# Patient Record
Sex: FEMALE | Race: WHITE | NOT HISPANIC OR LATINO | Employment: UNEMPLOYED | URBAN - NONMETROPOLITAN AREA
[De-identification: names, ages, dates, MRNs, and addresses within clinical notes are randomized per-mention and may not be internally consistent; named-entity substitution may affect disease eponyms.]

---

## 2017-06-01 ENCOUNTER — TELEPHONE (OUTPATIENT)
Dept: OBSTETRICS AND GYNECOLOGY | Facility: CLINIC | Age: 42
End: 2017-06-01

## 2017-06-01 RX ORDER — NORETHINDRONE ACETATE AND ETHINYL ESTRADIOL AND FERROUS FUMARATE 1MG-20(24)
1 KIT ORAL DAILY
Qty: 28 TABLET | Refills: 1 | Status: SHIPPED | OUTPATIENT
Start: 2017-06-01 | End: 2018-06-26 | Stop reason: SDUPTHER

## 2017-06-01 NOTE — TELEPHONE ENCOUNTER
----- Message from Shilpa Gray sent at 6/1/2017  2:37 PM EDT -----  Contact: PT  PT IS SCHEDULED FOR ANNUAL IN June WITH ROSALIE, BUT NEEDS 1 PACK OF MINASTRIN 24 FE SENT TO [facility] IN [place].  THANKS

## 2017-06-15 VITALS
SYSTOLIC BLOOD PRESSURE: 124 MMHG | WEIGHT: 265 LBS | HEIGHT: 66 IN | BODY MASS INDEX: 42.59 KG/M2 | DIASTOLIC BLOOD PRESSURE: 76 MMHG

## 2017-06-16 ENCOUNTER — OFFICE VISIT (OUTPATIENT)
Dept: OBSTETRICS AND GYNECOLOGY | Facility: CLINIC | Age: 42
End: 2017-06-16

## 2017-06-16 VITALS
BODY MASS INDEX: 41.62 KG/M2 | DIASTOLIC BLOOD PRESSURE: 82 MMHG | WEIGHT: 259 LBS | SYSTOLIC BLOOD PRESSURE: 124 MMHG | HEIGHT: 66 IN

## 2017-06-16 DIAGNOSIS — Z01.419 ENCOUNTER FOR GYNECOLOGICAL EXAMINATION WITHOUT ABNORMAL FINDING: Primary | ICD-10-CM

## 2017-06-16 DIAGNOSIS — Z12.4 SCREENING FOR MALIGNANT NEOPLASM OF CERVIX: ICD-10-CM

## 2017-06-16 PROCEDURE — 99395 PREV VISIT EST AGE 18-39: CPT | Performed by: PHYSICIAN ASSISTANT

## 2017-06-16 NOTE — PROGRESS NOTES
"Subjective   Chief Complaint   Patient presents with   • Gynecologic Exam     Nilay Xiao is a 41 y.o. year old  presenting to be seen for her annual exam.   She has no complaints today  She desires refills minastrin fe -nonsmoker     Has no family history of breast cancer     Past Medical History:   Diagnosis Date   • GERD (gastroesophageal reflux disease)    • History of Papanicolaou smear of cervix 2016        Current Outpatient Prescriptions:   •  Norethin Ace-Eth Estrad-FE (MINASTRIN 24 FE) 1-20 MG-MCG(24) chewable tablet, Chew 1 each Daily., Disp: 28 tablet, Rfl: 1   No Known Allergies   Past Surgical History:   Procedure Laterality Date   • CYST REMOVAL      from jawbone   • NO PAST SURGERIES     • WISDOM TOOTH EXTRACTION        Social History     Social History   • Marital status:      Spouse name: N/A   • Number of children: N/A   • Years of education: N/A     Occupational History   • Not on file.     Social History Main Topics   • Smoking status: Never Smoker   • Smokeless tobacco: Never Used   • Alcohol use No   • Drug use: No   • Sexual activity: Defer     Other Topics Concern   • Not on file     Social History Narrative      History reviewed. No pertinent family history.    The following portions of the patient's history were reviewed and updated as appropriate:problem list, current medications, allergies, past family history, past medical history, past social history and past surgical history.    Review of Systems   Constitutional: Negative.    Gastrointestinal: Negative.    Genitourinary: Negative.            Objective   /82  Ht 66\" (167.6 cm)  Wt 259 lb (117 kg)  LMP 2017  BMI 41.8 kg/m2    Physical Exam   Constitutional: She appears well-developed and well-nourished.   Pulmonary/Chest: Right breast exhibits no inverted nipple, no mass, no nipple discharge, no skin change and no tenderness. Left breast exhibits no inverted nipple, no mass, no nipple " discharge, no skin change and no tenderness.   Abdominal: Soft. Normal appearance. She exhibits no distension. There is no tenderness.   Genitourinary: Vagina normal and uterus normal. There is no tenderness or lesion on the right labia. There is no tenderness or lesion on the left labia. Cervix exhibits no motion tenderness and no discharge. Right adnexum displays no mass and no tenderness. Left adnexum displays no mass and no tenderness.   Skin: Skin is warm, dry and intact.   Psychiatric: She has a normal mood and affect. Her behavior is normal.            Assessment /Plan      Nilay was seen today for gynecologic exam.    Diagnoses and all orders for this visit:    Encounter for gynecological examination without abnormal finding  -     Mammo Screening Digital Tomosynthesis Bilateral With CAD    Screening for malignant neoplasm of cervix  -     Liquid-based Pap Smear, Screening; Future                 This note was electronically signed.    Augusta Jaramillo PA-C   June 16, 2017

## 2017-06-21 ENCOUNTER — TRANSCRIBE ORDERS (OUTPATIENT)
Dept: MAMMOGRAPHY | Facility: HOSPITAL | Age: 42
End: 2017-06-21

## 2017-06-30 DIAGNOSIS — Z12.4 SCREENING FOR MALIGNANT NEOPLASM OF CERVIX: ICD-10-CM

## 2017-07-03 ENCOUNTER — HOSPITAL ENCOUNTER (OUTPATIENT)
Dept: MAMMOGRAPHY | Facility: HOSPITAL | Age: 42
Discharge: HOME | End: 2017-07-03
Admitting: PHYSICIAN ASSISTANT

## 2017-07-03 PROCEDURE — 77063 BREAST TOMOSYNTHESIS BI: CPT

## 2017-07-03 PROCEDURE — G0202 SCR MAMMO BI INCL CAD: HCPCS

## 2018-04-11 ENCOUNTER — TRANSCRIBE ORDERS (OUTPATIENT)
Dept: ADMINISTRATIVE | Facility: HOSPITAL | Age: 43
End: 2018-04-11

## 2018-04-11 DIAGNOSIS — Z12.31 ENCOUNTER FOR SCREENING MAMMOGRAM FOR MALIGNANT NEOPLASM OF BREAST: Primary | ICD-10-CM

## 2018-04-12 ENCOUNTER — TRANSCRIBE ORDERS (OUTPATIENT)
Dept: ADMINISTRATIVE | Facility: HOSPITAL | Age: 43
End: 2018-04-12

## 2018-04-12 DIAGNOSIS — Z12.39 SCREENING BREAST EXAMINATION: Primary | ICD-10-CM

## 2018-06-26 ENCOUNTER — TELEPHONE (OUTPATIENT)
Dept: OBSTETRICS AND GYNECOLOGY | Facility: CLINIC | Age: 43
End: 2018-06-26

## 2018-06-26 RX ORDER — NORETHINDRONE ACETATE AND ETHINYL ESTRADIOL AND FERROUS FUMARATE 1MG-20(24)
1 KIT ORAL DAILY
Qty: 28 TABLET | Refills: 0 | Status: SHIPPED | OUTPATIENT
Start: 2018-06-26 | End: 2018-07-26 | Stop reason: SDUPTHER

## 2018-06-26 NOTE — TELEPHONE ENCOUNTER
----- Message from Julio Cesar Ragsdale sent at 6/26/2018  2:48 PM EDT -----  Contact: patient  This is Marcella's patient. She is requesting a refill on her birth control. She is scheduled for her annual on 07/06/2018.    [facility] in [place].

## 2018-07-06 ENCOUNTER — HOSPITAL ENCOUNTER (OUTPATIENT)
Dept: MAMMOGRAPHY | Facility: HOSPITAL | Age: 43
Discharge: HOME | End: 2018-07-06
Admitting: PHYSICIAN ASSISTANT

## 2018-07-06 ENCOUNTER — OFFICE VISIT (OUTPATIENT)
Dept: OBSTETRICS AND GYNECOLOGY | Facility: CLINIC | Age: 43
End: 2018-07-06

## 2018-07-06 VITALS
SYSTOLIC BLOOD PRESSURE: 126 MMHG | DIASTOLIC BLOOD PRESSURE: 80 MMHG | WEIGHT: 256.4 LBS | HEIGHT: 66 IN | BODY MASS INDEX: 41.21 KG/M2

## 2018-07-06 DIAGNOSIS — Z12.4 SCREENING FOR MALIGNANT NEOPLASM OF CERVIX: ICD-10-CM

## 2018-07-06 DIAGNOSIS — Z01.419 ENCOUNTER FOR GYNECOLOGICAL EXAMINATION WITHOUT ABNORMAL FINDING: Primary | ICD-10-CM

## 2018-07-06 DIAGNOSIS — Z30.41 ENCOUNTER FOR SURVEILLANCE OF CONTRACEPTIVE PILLS: ICD-10-CM

## 2018-07-06 DIAGNOSIS — Z12.39 SCREENING BREAST EXAMINATION: ICD-10-CM

## 2018-07-06 PROCEDURE — 77063 BREAST TOMOSYNTHESIS BI: CPT

## 2018-07-06 PROCEDURE — 77067 SCR MAMMO BI INCL CAD: CPT

## 2018-07-06 PROCEDURE — 99396 PREV VISIT EST AGE 40-64: CPT | Performed by: PHYSICIAN ASSISTANT

## 2018-07-06 RX ORDER — NORETHINDRONE ACETATE AND ETHINYL ESTRADIOL AND FERROUS FUMARATE 1MG-20(24)
1 KIT ORAL
COMMUNITY
Start: 2017-01-09 | End: 2019-06-25 | Stop reason: SDUPTHER

## 2018-07-06 NOTE — PROGRESS NOTES
"Subjective   Chief Complaint   Patient presents with   • Gynecologic Exam     Last pap done -WNL; MMG done today; no complaints       Nilay Xiao is a 43 y.o. year old  presenting to be seen for her annual gynecological exam.   She has no complaints or concerns  Desires refills of minastrin 24 fe-nonsmoker  Doesn't have bleed very often with minastrin  Had mammogram this morning [facility]    Past Medical History:   Diagnosis Date   • GERD (gastroesophageal reflux disease)    • History of Papanicolaou smear of cervix 2016        Current Outpatient Prescriptions:   •  Norethin Ace-Eth Estrad-FE (MINASTRIN 24 FE) 1-20 MG-MCG(24) chewable tablet, Chew 1 each Daily., Disp: 28 tablet, Rfl: 0  •  Norethin Ace-Eth Estrad-FE (MINASTRIN 24 FE) 1-20 MG-MCG(24) chewable tablet, Chew 1 tablet., Disp: , Rfl:    Allergies   Allergen Reactions   • Ampicillin Rash      Past Surgical History:   Procedure Laterality Date   • CYST REMOVAL      from jawbone   • NO PAST SURGERIES     • WISDOM TOOTH EXTRACTION        Social History     Social History   • Marital status:      Spouse name: N/A   • Number of children: N/A   • Years of education: N/A     Occupational History   • Not on file.     Social History Main Topics   • Smoking status: Never Smoker   • Smokeless tobacco: Never Used   • Alcohol use No   • Drug use: No   • Sexual activity: Yes     Partners: Male     Birth control/ protection: OCP     Other Topics Concern   • Not on file     Social History Narrative   • No narrative on file      Family History   Problem Relation Age of Onset   • Diabetes Father    • No Known Problems Mother        Review of Systems   Constitutional: Negative.    Gastrointestinal: Negative.    Genitourinary: Negative.            Objective   /80   Ht 167.6 cm (66\")   Wt 116 kg (256 lb 6.4 oz)   Breastfeeding? No   BMI 41.38 kg/m²     Physical Exam   Constitutional: She appears well-developed and well-nourished. She is cooperative. "   Pulmonary/Chest: Right breast exhibits no inverted nipple, no mass, no nipple discharge, no skin change and no tenderness. Left breast exhibits no inverted nipple, no mass, no nipple discharge, no skin change and no tenderness.   Abdominal: Soft. Normal appearance. There is no tenderness.   Genitourinary: Vagina normal and uterus normal. There is no tenderness or lesion on the right labia. There is no tenderness or lesion on the left labia. Cervix exhibits no motion tenderness and no discharge. Right adnexum displays no mass and no tenderness. Left adnexum displays no mass and no tenderness.   Genitourinary Comments: Pap done   Neurological: She is alert.   Skin: Skin is warm and dry.   Psychiatric: She has a normal mood and affect. Her behavior is normal.            Assessment and Plan  Nilay was seen today for gynecologic exam.    Diagnoses and all orders for this visit:    Encounter for gynecological examination without abnormal finding    Screening for malignant neoplasm of cervix  -     Liquid-based Pap Smear, Screening; Future    Encounter for surveillance of contraceptive pills      Patient Instructions   Self breast exam monthly  Regular excercise             This note was electronically signed.    Augusta Jaramillo PA-C   July 6, 2018

## 2018-07-12 DIAGNOSIS — Z12.4 SCREENING FOR MALIGNANT NEOPLASM OF CERVIX: ICD-10-CM

## 2018-07-26 RX ORDER — NORETHINDRONE ACETATE AND ETHINYL ESTRADIOL AND FERROUS FUMARATE 1MG-20(24)
KIT ORAL
Qty: 28 TABLET | Refills: 11 | Status: SHIPPED | OUTPATIENT
Start: 2018-07-26 | End: 2019-06-25 | Stop reason: SDUPTHER

## 2019-05-21 ENCOUNTER — TRANSCRIBE ORDERS (OUTPATIENT)
Dept: OBSTETRICS AND GYNECOLOGY | Facility: CLINIC | Age: 44
End: 2019-05-21

## 2019-05-21 DIAGNOSIS — Z12.39 SCREENING BREAST EXAMINATION: Primary | ICD-10-CM

## 2019-06-25 ENCOUNTER — TELEPHONE (OUTPATIENT)
Dept: OBSTETRICS AND GYNECOLOGY | Facility: CLINIC | Age: 44
End: 2019-06-25

## 2019-06-25 RX ORDER — NORETHINDRONE ACETATE AND ETHINYL ESTRADIOL AND FERROUS FUMARATE 1MG-20(24)
1 KIT ORAL DAILY
Qty: 28 TABLET | Refills: 1 | Status: SHIPPED | OUTPATIENT
Start: 2019-06-25 | End: 2019-07-19 | Stop reason: ALTCHOICE

## 2019-06-25 NOTE — TELEPHONE ENCOUNTER
----- Message from Annie Sung sent at 6/25/2019  1:32 PM EDT -----  Contact: Pt  Pt requested a refill for her birth control.    She is jerrell'd for her annual on 07-23    RX: [facility]

## 2019-07-19 ENCOUNTER — OFFICE VISIT (OUTPATIENT)
Dept: OBSTETRICS AND GYNECOLOGY | Facility: CLINIC | Age: 44
End: 2019-07-19

## 2019-07-19 ENCOUNTER — HOSPITAL ENCOUNTER (OUTPATIENT)
Dept: MAMMOGRAPHY | Facility: HOSPITAL | Age: 44
Discharge: HOME | End: 2019-07-19
Admitting: PHYSICIAN ASSISTANT

## 2019-07-19 VITALS
SYSTOLIC BLOOD PRESSURE: 142 MMHG | WEIGHT: 269.6 LBS | BODY MASS INDEX: 43.33 KG/M2 | HEIGHT: 66 IN | DIASTOLIC BLOOD PRESSURE: 94 MMHG

## 2019-07-19 DIAGNOSIS — Z01.419 ENCOUNTER FOR GYNECOLOGICAL EXAMINATION WITHOUT ABNORMAL FINDING: Primary | ICD-10-CM

## 2019-07-19 DIAGNOSIS — Z12.4 SCREENING FOR CERVICAL CANCER: ICD-10-CM

## 2019-07-19 DIAGNOSIS — Z30.41 ENCOUNTER FOR SURVEILLANCE OF CONTRACEPTIVE PILLS: ICD-10-CM

## 2019-07-19 DIAGNOSIS — Z12.39 SCREENING BREAST EXAMINATION: ICD-10-CM

## 2019-07-19 PROCEDURE — 99396 PREV VISIT EST AGE 40-64: CPT | Performed by: PHYSICIAN ASSISTANT

## 2019-07-19 PROCEDURE — 77067 SCR MAMMO BI INCL CAD: CPT

## 2019-07-19 PROCEDURE — 77063 BREAST TOMOSYNTHESIS BI: CPT

## 2019-07-19 RX ORDER — NORETHINDRONE ACETATE AND ETHINYL ESTRADIOL AND FERROUS FUMARATE 1MG-20(24)
1 KIT ORAL DAILY
Qty: 28 TABLET | Refills: 12 | Status: SHIPPED | OUTPATIENT
Start: 2019-07-19 | End: 2019-07-29

## 2019-07-19 NOTE — PATIENT INSTRUCTIONS
Encourage self breast exam monthly  Regular exercise  Recommend establish care with pcp. Blood pressure mildly elevated today and if remains up recommend progesterone only birth control

## 2019-07-19 NOTE — PROGRESS NOTES
"Subjective   Chief Complaint   Patient presents with   • Gynecologic Exam     Last pap: 18 WNL, MMG 19 Bi-rads 2. Needs refill on Minastrin 24 Fe       Nilay Xiao is a 44 y.o. year old  presenting to be seen for her annual gynecological exam.   She has no complaints or concerns.  Screening mammogram this morning at [facility].  Desires refills of Minastrin 24 FE and desires prescription dispense as written.  She is using Minastrin mainly for heavy periods and dysmenorrhea.    Past Medical History:   Diagnosis Date   • GERD (gastroesophageal reflux disease)    • History of Papanicolaou smear of cervix 2016        Current Outpatient Medications:   •  MINASTRIN 24 FE 1-20 MG-MCG(24) chewable tablet, Chew 1 tablet Daily., Disp: 28 tablet, Rfl: 12   Allergies   Allergen Reactions   • Ampicillin Rash      Past Surgical History:   Procedure Laterality Date   • CYST REMOVAL      from jawbone   • NO PAST SURGERIES     • WISDOM TOOTH EXTRACTION        Social History     Socioeconomic History   • Marital status:      Spouse name: Not on file   • Number of children: Not on file   • Years of education: Not on file   • Highest education level: Not on file   Tobacco Use   • Smoking status: Never Smoker   • Smokeless tobacco: Never Used   Substance and Sexual Activity   • Alcohol use: No   • Drug use: No   • Sexual activity: Yes     Partners: Male     Birth control/protection: OCP      Family History   Problem Relation Age of Onset   • Diabetes Father    • No Known Problems Mother        Review of Systems   Constitutional: Negative.    Gastrointestinal: Negative.    Genitourinary: Negative.            Objective   /94   Ht 167.6 cm (66\")   Wt 122 kg (269 lb 9.6 oz)   Breastfeeding? No   BMI 43.51 kg/m²     Physical Exam   Constitutional: She appears well-developed and well-nourished. She is cooperative. No distress.   Pulmonary/Chest: Right breast exhibits no inverted nipple, no mass, " no nipple discharge, no skin change and no tenderness. Left breast exhibits no inverted nipple, no mass, no nipple discharge, no skin change and no tenderness.   Abdominal: Soft. Normal appearance. There is no tenderness.   Genitourinary: Vagina normal and uterus normal. There is no tenderness or lesion on the right labia. There is no tenderness or lesion on the left labia. Cervix exhibits no motion tenderness and no discharge. Right adnexum displays no mass and no tenderness. Left adnexum displays no mass and no tenderness.   Genitourinary Comments: Pap done    Neurological: She is alert.   Skin: Skin is warm and dry.   Psychiatric: She has a normal mood and affect. Her behavior is normal.            Assessment and Plan  Nilay was seen today for gynecologic exam.    Diagnoses and all orders for this visit:    Encounter for gynecological examination without abnormal finding    Screening for cervical cancer  -     Liquid-based Pap Smear, Screening; Future    Encounter for surveillance of contraceptive pills    Other orders  -     MINASTRIN 24 FE 1-20 MG-MCG(24) chewable tablet; Chew 1 tablet Daily.      Patient Instructions   Encourage self breast exam monthly  Regular exercise  Recommend establish care with pcp. Blood pressure mildly elevated today and if remains up recommend progesterone only birth control              This note was electronically signed.    Augusta Jaramillo PA-C   July 19, 2019

## 2019-07-26 DIAGNOSIS — Z12.4 SCREENING FOR CERVICAL CANCER: ICD-10-CM

## 2019-07-29 ENCOUNTER — TELEPHONE (OUTPATIENT)
Dept: OBSTETRICS AND GYNECOLOGY | Facility: CLINIC | Age: 44
End: 2019-07-29

## 2019-07-29 RX ORDER — NORETHINDRONE ACETATE AND ETHINYL ESTRADIOL AND FERROUS FUMARATE 1MG-20(24)
1 KIT ORAL DAILY
Qty: 28 TABLET | Refills: 12 | Status: SHIPPED | OUTPATIENT
Start: 2019-07-29 | End: 2019-08-19 | Stop reason: SDUPTHER

## 2019-07-29 NOTE — TELEPHONE ENCOUNTER
----- Message from Shilpa Gray sent at 7/29/2019  9:01 AM EDT -----  Contact: PT  THIS IS ROSALIE'S PT.  SHE CALLED AND SAID THE MINASTRIN ISN'T COVERED BY HER INSURANCE.  CAN WE SEND MIBELAS TO [facility] IN [place]?  THANKS

## 2019-08-19 ENCOUNTER — OFFICE VISIT (OUTPATIENT)
Dept: FAMILY MEDICINE CLINIC | Facility: CLINIC | Age: 44
End: 2019-08-19

## 2019-08-19 VITALS
TEMPERATURE: 98.7 F | OXYGEN SATURATION: 98 % | BODY MASS INDEX: 43.71 KG/M2 | HEIGHT: 66 IN | WEIGHT: 272 LBS | HEART RATE: 98 BPM | SYSTOLIC BLOOD PRESSURE: 132 MMHG | DIASTOLIC BLOOD PRESSURE: 92 MMHG

## 2019-08-19 DIAGNOSIS — Z13.220 ENCOUNTER FOR LIPID SCREENING FOR CARDIOVASCULAR DISEASE: ICD-10-CM

## 2019-08-19 DIAGNOSIS — I10 ESSENTIAL HYPERTENSION: ICD-10-CM

## 2019-08-19 DIAGNOSIS — Z30.09 UNWANTED FERTILITY: Primary | ICD-10-CM

## 2019-08-19 DIAGNOSIS — J30.89 SEASONAL ALLERGIC RHINITIS DUE TO OTHER ALLERGIC TRIGGER: ICD-10-CM

## 2019-08-19 DIAGNOSIS — Z13.6 ENCOUNTER FOR LIPID SCREENING FOR CARDIOVASCULAR DISEASE: ICD-10-CM

## 2019-08-19 DIAGNOSIS — M79.605 LEFT LEG PAIN: ICD-10-CM

## 2019-08-19 PROCEDURE — 99204 OFFICE O/P NEW MOD 45 MIN: CPT | Performed by: FAMILY MEDICINE

## 2019-08-19 PROCEDURE — 82306 VITAMIN D 25 HYDROXY: CPT | Performed by: FAMILY MEDICINE

## 2019-08-19 PROCEDURE — 80061 LIPID PANEL: CPT | Performed by: FAMILY MEDICINE

## 2019-08-19 PROCEDURE — 82607 VITAMIN B-12: CPT | Performed by: FAMILY MEDICINE

## 2019-08-19 PROCEDURE — 83735 ASSAY OF MAGNESIUM: CPT | Performed by: FAMILY MEDICINE

## 2019-08-19 PROCEDURE — 84443 ASSAY THYROID STIM HORMONE: CPT | Performed by: FAMILY MEDICINE

## 2019-08-19 PROCEDURE — 85025 COMPLETE CBC W/AUTO DIFF WBC: CPT | Performed by: FAMILY MEDICINE

## 2019-08-19 PROCEDURE — 80053 COMPREHEN METABOLIC PANEL: CPT | Performed by: FAMILY MEDICINE

## 2019-08-19 RX ORDER — OMEGA-3 FATTY ACIDS/FISH OIL 300-1000MG
CAPSULE ORAL
COMMUNITY
End: 2021-08-03

## 2019-08-19 RX ORDER — MONTELUKAST SODIUM 10 MG/1
10 TABLET ORAL NIGHTLY
Qty: 30 TABLET | Refills: 2 | Status: SHIPPED | OUTPATIENT
Start: 2019-08-19 | End: 2019-11-04 | Stop reason: SDUPTHER

## 2019-08-19 RX ORDER — IBUPROFEN 200 MG
200 TABLET ORAL EVERY 8 HOURS PRN
COMMUNITY

## 2019-08-19 RX ORDER — NORETHINDRONE ACETATE AND ETHINYL ESTRADIOL AND FERROUS FUMARATE 1MG-20(24)
1 KIT ORAL DAILY
Qty: 28 TABLET | Refills: 12 | Status: SHIPPED | OUTPATIENT
Start: 2019-08-19 | End: 2020-07-21

## 2019-08-19 RX ORDER — ACETAMINOPHEN 500 MG
500 TABLET ORAL EVERY 6 HOURS PRN
COMMUNITY

## 2019-08-19 RX ORDER — LISINOPRIL 20 MG/1
20 TABLET ORAL DAILY
Qty: 30 TABLET | Refills: 2 | Status: SHIPPED | OUTPATIENT
Start: 2019-08-19 | End: 2019-11-04 | Stop reason: SDUPTHER

## 2019-08-19 RX ORDER — NORETHINDRONE ACETATE AND ETHINYL ESTRADIOL AND FERROUS FUMARATE 1MG-20(24)
1 KIT ORAL DAILY
Refills: 12 | COMMUNITY
Start: 2019-07-29 | End: 2020-07-21 | Stop reason: ALTCHOICE

## 2019-08-20 LAB
25(OH)D3 SERPL-MCNC: 9.5 NG/ML (ref 30–100)
ALBUMIN SERPL-MCNC: 4.4 G/DL (ref 3.5–5.2)
ALBUMIN/GLOB SERPL: 1.4 G/DL
ALP SERPL-CCNC: 62 U/L (ref 39–117)
ALT SERPL W P-5'-P-CCNC: 7 U/L (ref 1–33)
ANION GAP SERPL CALCULATED.3IONS-SCNC: 13.4 MMOL/L (ref 5–15)
AST SERPL-CCNC: 11 U/L (ref 1–32)
BASOPHILS # BLD AUTO: 0.07 10*3/MM3 (ref 0–0.2)
BASOPHILS NFR BLD AUTO: 1.1 % (ref 0–1.5)
BILIRUB SERPL-MCNC: 0.2 MG/DL (ref 0.2–1.2)
BUN BLD-MCNC: 8 MG/DL (ref 6–20)
BUN/CREAT SERPL: 10.7 (ref 7–25)
CALCIUM SPEC-SCNC: 9.1 MG/DL (ref 8.6–10.5)
CHLORIDE SERPL-SCNC: 102 MMOL/L (ref 98–107)
CHOLEST SERPL-MCNC: 195 MG/DL (ref 0–200)
CO2 SERPL-SCNC: 23.6 MMOL/L (ref 22–29)
CREAT BLD-MCNC: 0.75 MG/DL (ref 0.57–1)
DEPRECATED RDW RBC AUTO: 45.4 FL (ref 37–54)
EOSINOPHIL # BLD AUTO: 0.09 10*3/MM3 (ref 0–0.4)
EOSINOPHIL NFR BLD AUTO: 1.4 % (ref 0.3–6.2)
ERYTHROCYTE [DISTWIDTH] IN BLOOD BY AUTOMATED COUNT: 13.2 % (ref 12.3–15.4)
GFR SERPL CREATININE-BSD FRML MDRD: 85 ML/MIN/1.73
GLOBULIN UR ELPH-MCNC: 3.1 GM/DL
GLUCOSE BLD-MCNC: 97 MG/DL (ref 65–99)
HCT VFR BLD AUTO: 44.1 % (ref 34–46.6)
HDLC SERPL-MCNC: 60 MG/DL (ref 40–60)
HGB BLD-MCNC: 13.7 G/DL (ref 12–15.9)
IMM GRANULOCYTES # BLD AUTO: 0.02 10*3/MM3 (ref 0–0.05)
IMM GRANULOCYTES NFR BLD AUTO: 0.3 % (ref 0–0.5)
LDLC SERPL CALC-MCNC: 115 MG/DL (ref 0–100)
LDLC/HDLC SERPL: 1.91 {RATIO}
LYMPHOCYTES # BLD AUTO: 1.41 10*3/MM3 (ref 0.7–3.1)
LYMPHOCYTES NFR BLD AUTO: 21.2 % (ref 19.6–45.3)
MAGNESIUM SERPL-MCNC: 2.2 MG/DL (ref 1.6–2.6)
MCH RBC QN AUTO: 29.1 PG (ref 26.6–33)
MCHC RBC AUTO-ENTMCNC: 31.1 G/DL (ref 31.5–35.7)
MCV RBC AUTO: 93.8 FL (ref 79–97)
MONOCYTES # BLD AUTO: 0.38 10*3/MM3 (ref 0.1–0.9)
MONOCYTES NFR BLD AUTO: 5.7 % (ref 5–12)
NEUTROPHILS # BLD AUTO: 4.69 10*3/MM3 (ref 1.7–7)
NEUTROPHILS NFR BLD AUTO: 70.3 % (ref 42.7–76)
NRBC BLD AUTO-RTO: 0 /100 WBC (ref 0–0.2)
PLATELET # BLD AUTO: 308 10*3/MM3 (ref 140–450)
PMV BLD AUTO: 12.4 FL (ref 6–12)
POTASSIUM BLD-SCNC: 4.1 MMOL/L (ref 3.5–5.2)
PROT SERPL-MCNC: 7.5 G/DL (ref 6–8.5)
RBC # BLD AUTO: 4.7 10*6/MM3 (ref 3.77–5.28)
SODIUM BLD-SCNC: 139 MMOL/L (ref 136–145)
TRIGL SERPL-MCNC: 101 MG/DL (ref 0–150)
TSH SERPL DL<=0.05 MIU/L-ACNC: 3.25 MIU/ML (ref 0.27–4.2)
VIT B12 BLD-MCNC: 292 PG/ML (ref 211–946)
VLDLC SERPL-MCNC: 20.2 MG/DL (ref 5–40)
WBC NRBC COR # BLD: 6.66 10*3/MM3 (ref 3.4–10.8)

## 2019-09-09 ENCOUNTER — OFFICE VISIT (OUTPATIENT)
Dept: FAMILY MEDICINE CLINIC | Facility: CLINIC | Age: 44
End: 2019-09-09

## 2019-09-09 VITALS
BODY MASS INDEX: 42.75 KG/M2 | HEIGHT: 66 IN | TEMPERATURE: 99.1 F | SYSTOLIC BLOOD PRESSURE: 110 MMHG | OXYGEN SATURATION: 98 % | HEART RATE: 115 BPM | DIASTOLIC BLOOD PRESSURE: 81 MMHG | WEIGHT: 266 LBS

## 2019-09-09 DIAGNOSIS — E53.8 VITAMIN B12 DEFICIENCY: ICD-10-CM

## 2019-09-09 DIAGNOSIS — D17.21 LIPOMA OF RIGHT UPPER EXTREMITY: ICD-10-CM

## 2019-09-09 DIAGNOSIS — E55.9 VITAMIN D DEFICIENCY: Primary | ICD-10-CM

## 2019-09-09 PROCEDURE — 99214 OFFICE O/P EST MOD 30 MIN: CPT | Performed by: FAMILY MEDICINE

## 2019-09-09 RX ORDER — ERGOCALCIFEROL 1.25 MG/1
50000 CAPSULE ORAL WEEKLY
Qty: 8 CAPSULE | Refills: 0 | Status: SHIPPED | OUTPATIENT
Start: 2019-09-09 | End: 2022-09-19

## 2019-09-09 NOTE — PROGRESS NOTES
"Nilay Xiao     VITALS: Blood pressure 132/92, pulse 98, temperature 98.7 °F (37.1 °C), temperature source Oral, height 167.6 cm (66\"), weight 123 kg (272 lb), SpO2 98 %, not currently breastfeeding.    Subjective  Chief Complaint:   Chief Complaint   Patient presents with   • Hypertension        History of Present Illness:  Patient is a 44 y.o.  female who presents to clinic secondary to establishment of care.  She has several concerns today.    Patient thinks that she is progressing to hypertension.  She brings in the list of her blood pressures for the last week.  Her blood pressures are in the 130s to 160s/90s to 110s.  She complains of anxiety, blurry vision, fatigue with these blood pressures.  She also complains about headaches and dizziness.  She denies any shortness of breath or chest pain.  She denies any edema.  She has never had blood pressure problems before.  She does try to follow a low-salt diet.    Patient also complains of sinus congestion and headaches.  She denies any fevers, chills, ear pain.  She denies any rhinorrhea, coughing, shortness of breath.  She has tried Benadryl and phenylephrine without any alleviation in her symptoms.    Patient complains of left leg numbness and tingling, maybe a little cramping.  She denies any chronic back pain.  She denies any changes in urination or bowel movements.  She denies any pain.    No complaints about any of the medications.    The following portions of the patient's history were reviewed and updated as appropriate: allergies, current medications, past family history, past medical history, past social history, past surgical history and problem list.    Past Medical History  Past Medical History:   Diagnosis Date   • GERD (gastroesophageal reflux disease)    • History of Papanicolaou smear of cervix 06/2016       Review of Systems   Constitutional: Positive for fatigue. Negative for chills and fever.   HENT: Positive for congestion, sinus pressure and " sinus pain. Negative for ear pain and rhinorrhea.    Eyes: Positive for visual disturbance.   Respiratory: Negative for cough and shortness of breath.    Cardiovascular: Negative for chest pain and palpitations.   Gastrointestinal: Negative for constipation, diarrhea, nausea and vomiting.   Musculoskeletal: Negative for back pain and neck pain.   Neurological: Positive for dizziness, numbness and headaches.   Psychiatric/Behavioral: The patient is nervous/anxious.        Surgical History  Past Surgical History:   Procedure Laterality Date   • CYST REMOVAL      from jawbone   • NO PAST SURGERIES     • WISDOM TOOTH EXTRACTION         Family History  Family History   Problem Relation Age of Onset   • Diabetes Father    • Kidney nephrosis Mother    • Cancer Maternal Aunt    • Cancer Paternal Uncle        Social History  Social History     Socioeconomic History   • Marital status:      Spouse name: Not on file   • Number of children: Not on file   • Years of education: Not on file   • Highest education level: Not on file   Tobacco Use   • Smoking status: Never Smoker   • Smokeless tobacco: Never Used   Substance and Sexual Activity   • Alcohol use: No   • Drug use: No   • Sexual activity: Yes     Partners: Male     Birth control/protection: OCP       Objective  Physical Exam    Gen: Patient in NAD. Pleasant and answers appropriately. A&Ox3.    Skin: Warm and dry with normal turgor. No purpura, rashes, or unusual pigmentation noted. Hair is normal in appearance and distribution.    HEENT: NC/AT. No lesions noted. Conjunctiva clear, sclera nonicteric. PERRL. EOMI without nystagmus or strabismus. Fundi appear benign. No hemorrhages or exudates of eyes. Auditory canals are patent bilaterally without lesions. TMs intact,  nonerythematous, bulging without lesions. Nasal mucosa erythematous  and edematous. Frontal and maxillary sinuses are tender. O/P erythematous and moist without exudate.    Neck: Supple without lymph  nodes palpated. FROM.     Lungs: CTA B/L without rales, rhonchi, crackles, or wheezes.    Heart: RRR. S1 and S2 normal. No S3 or S4. No MRGT.    Abd: Soft, nontender,nondistended. (+)BSx4 quadrants.     Extrem: No CCE. Radial pulses 2+/4 and equal B/L. FROMx4. No bone, joint, or muscle tenderness noted. SLRT  Benign.    Neuro: No focal motor/sensory deficits.    Procedures    Assessment/Plan  Nilay Xiao is a 44 y.o. here for establishment of care.  Diagnoses and all orders for this visit:    Unwanted fertility  -     Norethin Ace-Eth Estrad-FE (MIBELAS 24 FE) 1-20 MG-MCG(24) chewable tablet; Chew 1 tablet Daily.    Essential hypertension  -     lisinopril (PRINIVIL,ZESTRIL) 20 MG tablet; Take 1 tablet by mouth Daily.  -     CBC Auto Differential; Future  -     Comprehensive Metabolic Panel; Future  -     TSH; Future  -     CBC Auto Differential  -     Comprehensive Metabolic Panel  -     TSH    Seasonal allergic rhinitis due to other allergic trigger  -     montelukast (SINGULAIR) 10 MG tablet; Take 1 tablet by mouth Every Night.    Encounter for lipid screening for cardiovascular disease  -     Lipid Panel; Future  -     Lipid Panel    Left leg pain  -     Vitamin B12; Future  -     Vitamin D 25 Hydroxy; Future  -     Magnesium; Future  -     Vitamin B12  -     Vitamin D 25 Hydroxy  -     Magnesium      Patient's Body mass index is 43.9 kg/m². BMI is above normal parameters. Recommendations include: exercise counseling and nutrition counseling.     Findings and plans discussed with patient who verbalizes understanding and agreement. Will followup with patient once results are in. Patient to followup at clinic PRN or in three weeks for further medical followup.    Stephenie Ewing MD    EMR Dragon/Transcription Disclaimer:  Much of this encounter note is an electronic transcription/translation of spoken language to printed text.  The electronic translation of spoken language may permit erroneous, or at times,  nonsensical words or phrases to be inadvertently transcribed.  Although I have reviewed the note for such errors, some may still exist.  Answers for HPI/ROS submitted by the patient on 8/17/2019   Hypertension  Chronicity: new  Progression since onset: unchanged  anxiety: Yes  blurred vision: Yes  malaise/fatigue: Yes  orthopnea: No  peripheral edema: No  PND: No  sweats: No  Agents associated with hypertension: decongestants, NSAIDs, oral contraceptives  CAD risks: obesity, sedentary lifestyle, stress  Compliance problems: diet, exercise

## 2019-09-13 ENCOUNTER — TELEPHONE (OUTPATIENT)
Dept: FAMILY MEDICINE CLINIC | Facility: CLINIC | Age: 44
End: 2019-09-13

## 2019-09-30 NOTE — PROGRESS NOTES
"Nilay Xiao     VITALS: Blood pressure 110/81, pulse 115, temperature 99.1 °F (37.3 °C), temperature source Oral, height 167.6 cm (65.98\"), weight 121 kg (266 lb), SpO2 98 %, not currently breastfeeding.    Subjective  Chief Complaint:   Chief Complaint   Patient presents with   • Hypertension        History of Present Illness:  Patient is a 44 y.o.  female with a medical history significant for hypertension and allergic rhinitis who presents to clinic secondary to medical followup.  She states that she has a mass on her right shoulder that she would like me to look at today.  It has been there for several years.  It does keep on growing.  She is not bothered by it.  It does not hurt her.  Nothing makes it worse.  Nothing makes it better.  She is also here today to go over lab work.  She brings in today a list of her blood pressures that she is taking from home.  Blood pressures ranged in the 120s to 130s/70s to 80s.    No complaints about any of the medications.    The following portions of the patient's history were reviewed and updated as appropriate: allergies, current medications, past family history, past medical history, past social history, past surgical history and problem list.    Past Medical History  Past Medical History:   Diagnosis Date   • GERD (gastroesophageal reflux disease)    • History of Papanicolaou smear of cervix 06/2016       Review of Systems   Respiratory: Negative for shortness of breath and wheezing.    Cardiovascular: Negative for chest pain and palpitations.       Surgical History  Past Surgical History:   Procedure Laterality Date   • CYST REMOVAL      from jawbone   • NO PAST SURGERIES     • WISDOM TOOTH EXTRACTION         Family History  Family History   Problem Relation Age of Onset   • Diabetes Father    • Kidney nephrosis Mother    • Cancer Maternal Aunt    • Cancer Paternal Uncle        Social History  Social History     Socioeconomic History   • Marital status:      " Spouse name: Not on file   • Number of children: Not on file   • Years of education: Not on file   • Highest education level: Not on file   Tobacco Use   • Smoking status: Never Smoker   • Smokeless tobacco: Never Used   Substance and Sexual Activity   • Alcohol use: No   • Drug use: No   • Sexual activity: Yes     Partners: Male     Birth control/protection: OCP       Objective  Physical Exam    Gen: Patient in NAD. Pleasant and answers appropriately. A&Ox3.    Skin: Warm and dry with normal turgor. No purpura, rashes, or unusual pigmentation noted. Hair is normal in appearance and distribution.  Lipoma over right shoulder palpated.    HEENT: NC/AT. No lesions noted. Conjunctiva clear, sclera nonicteric. PERRL. EOMI without nystagmus or strabismus. Fundi appear benign. No hemorrhages or exudates of eyes. Auditory canals are patent bilaterally without lesions. TMs intact,  nonerythematous, nonbulging without lesions. Nasal mucosa pink, nonerythematous, and nonedematous. Frontal and maxillary sinuses are nontender. O/P nonerythematous and moist without exudate.    Neck: Supple without lymph nodes palpated. FROM.     Lungs: CTA B/L without rales, rhonchi, crackles, or wheezes.    Heart: RRR. S1 and S2 normal. No S3 or S4. No MRGT.    Abd: Soft, nontender,nondistended. (+)BSx4 quadrants.     Extrem: No CCE. Radial pulses 2+/4 and equal B/L. FROMx4. No bone, joint, or muscle tenderness noted.    Neuro: No focal motor/sensory deficits.    Procedures    Assessment/Plan  Nilay Xiao is a 44 y.o. here for medical followup.  Diagnoses and all orders for this visit:    Vitamin D deficiency  -     vitamin D (ERGOCALCIFEROL) 16089 units capsule capsule; Take 1 capsule by mouth 1 (One) Time Per Week.    Vitamin B12 deficiency  Patient to stop by for B12 shots weekly.    Lipoma of right upper extremity  Discussed with patient.  Discussed surgical referral.  Patient will wait for now.    Patient's Body mass index is 42.95 kg/m².  BMI is above normal parameters. Recommendations include: exercise counseling and nutrition counseling.        Findings and plans discussed with patient who verbalizes understanding and agreement. Will followup with patient once results are in. Patient to followup at clinic PRN or in 2 months for further medical followup.    MD CHARLES Prather Dragon/Transcription Disclaimer:  Much of this encounter note is an electronic transcription/translation of spoken language to printed text.  The electronic translation of spoken language may permit erroneous, or at times, nonsensical words or phrases to be inadvertently transcribed.  Although I have reviewed the note for such errors, some may still exist.

## 2019-11-04 ENCOUNTER — TELEPHONE (OUTPATIENT)
Dept: FAMILY MEDICINE CLINIC | Facility: CLINIC | Age: 44
End: 2019-11-04

## 2019-11-04 DIAGNOSIS — I10 ESSENTIAL HYPERTENSION: ICD-10-CM

## 2019-11-04 DIAGNOSIS — J30.89 SEASONAL ALLERGIC RHINITIS DUE TO OTHER ALLERGIC TRIGGER: ICD-10-CM

## 2019-11-04 RX ORDER — LISINOPRIL 20 MG/1
20 TABLET ORAL DAILY
Qty: 90 TABLET | Refills: 3 | Status: SHIPPED | OUTPATIENT
Start: 2019-11-04 | End: 2020-11-13 | Stop reason: SDUPTHER

## 2019-11-04 RX ORDER — MONTELUKAST SODIUM 10 MG/1
10 TABLET ORAL NIGHTLY
Qty: 90 TABLET | Refills: 3 | Status: SHIPPED | OUTPATIENT
Start: 2019-11-04 | End: 2020-11-13 | Stop reason: SDUPTHER

## 2019-11-04 NOTE — TELEPHONE ENCOUNTER
Patient called reports her last visit in Sept. Is going to cost her $140,i checked with [place] her insurance paid nothing because she said it is probably a high deductible,she has a follow up in November,she cannot afford to pay this so she is just requesting medication refills on her Lisinopril & Singular for as many months as you can,reports her blood pressures have been good & she can fax you her log?

## 2019-11-05 NOTE — TELEPHONE ENCOUNTER
No need to fax. Just let her know that the top number needs to be below 130 and the bottom number needs to be below 100. Just check it several times a week - if they start being elevated to call. I refilled for a year - have her plan on coming back sometime in August 2020 for a recheck. Thanks. Let her know that if she needs to be seen, we do have same day appointments. Thanks.

## 2019-11-05 NOTE — TELEPHONE ENCOUNTER
No need to fax. Just let her know that the top number needs to be below 130 and the bottom number needs to be below 100. Just check it several times a week - if they start being elevated to call. I refilled for a year - have her plan on coming back sometime in August 2020 for a recheck. Thanks. Let her know that if she needs to be seen, we do have same day appointments. Thanks.      Patient notified & verbalized understanding.

## 2020-05-12 ENCOUNTER — TRANSCRIBE ORDERS (OUTPATIENT)
Dept: MAMMOGRAPHY | Facility: HOSPITAL | Age: 45
End: 2020-05-12

## 2020-05-12 DIAGNOSIS — Z12.31 ENCOUNTER FOR SCREENING MAMMOGRAM FOR MALIGNANT NEOPLASM OF BREAST: Primary | ICD-10-CM

## 2020-07-21 ENCOUNTER — OFFICE VISIT (OUTPATIENT)
Dept: OBSTETRICS AND GYNECOLOGY | Facility: CLINIC | Age: 45
End: 2020-07-21

## 2020-07-21 ENCOUNTER — HOSPITAL ENCOUNTER (OUTPATIENT)
Dept: MAMMOGRAPHY | Facility: HOSPITAL | Age: 45
Discharge: HOME | End: 2020-07-21
Admitting: PHYSICIAN ASSISTANT

## 2020-07-21 VITALS
SYSTOLIC BLOOD PRESSURE: 136 MMHG | DIASTOLIC BLOOD PRESSURE: 86 MMHG | WEIGHT: 265.8 LBS | HEIGHT: 66 IN | BODY MASS INDEX: 42.72 KG/M2

## 2020-07-21 DIAGNOSIS — Z12.4 SCREENING FOR CERVICAL CANCER: ICD-10-CM

## 2020-07-21 DIAGNOSIS — Z01.419 ENCOUNTER FOR GYNECOLOGICAL EXAMINATION WITHOUT ABNORMAL FINDING: Primary | ICD-10-CM

## 2020-07-21 DIAGNOSIS — Z12.31 ENCOUNTER FOR SCREENING MAMMOGRAM FOR MALIGNANT NEOPLASM OF BREAST: ICD-10-CM

## 2020-07-21 DIAGNOSIS — Z30.41 ENCOUNTER FOR SURVEILLANCE OF CONTRACEPTIVE PILLS: ICD-10-CM

## 2020-07-21 PROCEDURE — 99396 PREV VISIT EST AGE 40-64: CPT | Performed by: PHYSICIAN ASSISTANT

## 2020-07-21 PROCEDURE — 77067 SCR MAMMO BI INCL CAD: CPT

## 2020-07-21 PROCEDURE — 77063 BREAST TOMOSYNTHESIS BI: CPT

## 2020-07-21 RX ORDER — ACETAMINOPHEN AND CODEINE PHOSPHATE 120; 12 MG/5ML; MG/5ML
1 SOLUTION ORAL DAILY
Qty: 28 TABLET | Refills: 12 | Status: SHIPPED | OUTPATIENT
Start: 2020-07-21 | End: 2021-07-21

## 2020-07-21 NOTE — PROGRESS NOTES
Subjective   Chief Complaint   Patient presents with   • Gynecologic Exam     Last pap done 19-WNL; MMG done today; No complaints       Nilay Xiao is a 45 y.o. year old  presenting to be seen for her annual gynecological exam.   She has no complaints  Had screening mammogram at [facility] this morning  Was started on lisinopril last August for hypertension  Has been taking Junel fe 1/20 ocp and desires to continue ocp  LMP 2020       Past Medical History:   Diagnosis Date   • GERD (gastroesophageal reflux disease)    • History of Papanicolaou smear of cervix 2016        Current Outpatient Medications:   •  acetaminophen (TYLENOL) 500 MG tablet, Take 500 mg by mouth Every 6 (Six) Hours As Needed for Mild Pain ., Disp: , Rfl:   •  ibuprofen (ADVIL,MOTRIN) 200 MG tablet, Take 200 mg by mouth Every 8 (Eight) Hours As Needed for Mild Pain ., Disp: , Rfl:   •  Ibuprofen (MIDOL) 200 MG capsule, Take  by mouth., Disp: , Rfl:   •  lisinopril (PRINIVIL,ZESTRIL) 20 MG tablet, Take 1 tablet by mouth Daily., Disp: 90 tablet, Rfl: 3  •  montelukast (SINGULAIR) 10 MG tablet, Take 1 tablet by mouth Every Night., Disp: 90 tablet, Rfl: 3  •  vitamin D (ERGOCALCIFEROL) 24056 units capsule capsule, Take 1 capsule by mouth 1 (One) Time Per Week., Disp: 8 capsule, Rfl: 0  •  norethindrone (MICRONOR) 0.35 MG tablet, Take 1 tablet by mouth Daily., Disp: 28 tablet, Rfl: 12   Allergies   Allergen Reactions   • Ampicillin Rash      Past Surgical History:   Procedure Laterality Date   • CYST REMOVAL      from jawbone   • NO PAST SURGERIES     • WISDOM TOOTH EXTRACTION        Social History     Socioeconomic History   • Marital status:      Spouse name: Not on file   • Number of children: Not on file   • Years of education: Not on file   • Highest education level: Not on file   Tobacco Use   • Smoking status: Never Smoker   • Smokeless tobacco: Never Used   Substance and Sexual Activity   • Alcohol use: No   • Drug use: No  "  • Sexual activity: Yes     Partners: Male     Birth control/protection: OCP      Family History   Problem Relation Age of Onset   • Diabetes Father    • Kidney nephrosis Mother    • Cancer Maternal Aunt    • Cancer Paternal Uncle        Review of Systems   Constitutional: Negative for chills, fatigue and fever.   Gastrointestinal: Negative for abdominal pain, nausea and vomiting.   Endocrine:        Hot flashes   Genitourinary: Negative for difficulty urinating, dysuria, menstrual problem, pelvic pain, vaginal bleeding and vaginal discharge.   Musculoskeletal: Negative.            Objective   /86   Ht 167.6 cm (65.98\")   Wt 121 kg (265 lb 12.8 oz)   LMP 07/17/2020 (Exact Date)   Breastfeeding No   BMI 42.93 kg/m²     Physical Exam   Constitutional: She is cooperative. No distress.   Eyes: Conjunctivae, EOM and lids are normal.   Pulmonary/Chest: Right breast exhibits no inverted nipple, no mass, no nipple discharge, no skin change and no tenderness. Left breast exhibits no inverted nipple, no mass, no nipple discharge, no skin change and no tenderness.   Abdominal: Soft. Normal appearance. There is no tenderness. There is no rigidity and no guarding.   Genitourinary: Uterus normal. There is no tenderness or lesion on the right labia. There is no tenderness or lesion on the left labia. Cervix exhibits no motion tenderness, no discharge and no friability. Right adnexum displays no mass and no tenderness. Left adnexum displays no mass and no tenderness. No erythema or tenderness in the vagina. No vaginal discharge found.   Genitourinary Comments: Pap done  Scant dark blood seen   Musculoskeletal: Normal range of motion.   Neurological: She is alert.   Skin: Skin is warm and dry. No lesion and no rash noted.   Psychiatric: She has a normal mood and affect. Her behavior is normal. Thought content normal.            Assessment and Plan  Nilay was seen today for gynecologic exam.    Diagnoses and all orders " for this visit:    Encounter for gynecological examination without abnormal finding    Screening for cervical cancer  -     Liquid-based Pap Smear, Screening; Future    Encounter for surveillance of contraceptive pills    Other orders  -     norethindrone (MICRONOR) 0.35 MG tablet; Take 1 tablet by mouth Daily.      Patient Instructions   Encourage self breast exam monthly  Annual screening mammogram  Regular exercise  Discussed hypertension, age and estrogen containing contraception not advised.  Recommend switching to progesterone only contraception and will go to [facility]. Patient advised to take ocp consistently                 This note was electronically signed.    Augusta Jaramillo PA-C   July 21, 2020

## 2020-07-21 NOTE — PATIENT INSTRUCTIONS
Encourage self breast exam monthly  Annual screening mammogram  Regular exercise  Discussed hypertension, age and estrogen containing contraception not advised.  Recommend switching to progesterone only contraception and will go to [facility]. Patient advised to take ocp consistently

## 2020-07-28 DIAGNOSIS — Z12.4 SCREENING FOR CERVICAL CANCER: ICD-10-CM

## 2020-11-13 DIAGNOSIS — J30.89 SEASONAL ALLERGIC RHINITIS DUE TO OTHER ALLERGIC TRIGGER: ICD-10-CM

## 2020-11-13 DIAGNOSIS — I10 ESSENTIAL HYPERTENSION: ICD-10-CM

## 2020-11-13 RX ORDER — MONTELUKAST SODIUM 10 MG/1
10 TABLET ORAL NIGHTLY
Qty: 90 TABLET | Refills: 1 | Status: SHIPPED | OUTPATIENT
Start: 2020-11-13 | End: 2021-02-02 | Stop reason: SDUPTHER

## 2020-11-13 RX ORDER — LISINOPRIL 20 MG/1
20 TABLET ORAL DAILY
Qty: 90 TABLET | Refills: 1 | Status: SHIPPED | OUTPATIENT
Start: 2020-11-13 | End: 2021-02-02 | Stop reason: SDUPTHER

## 2021-01-26 ENCOUNTER — TELEPHONE (OUTPATIENT)
Dept: FAMILY MEDICINE CLINIC | Facility: CLINIC | Age: 46
End: 2021-01-26

## 2021-01-26 NOTE — TELEPHONE ENCOUNTER
PT IS REQUESTING A CALL BACK REGARDING A BILL FROM AN OUTSIDE LAB THAT SHE KEEPS GETTING LATE NOTICES FROM THAT SHE SHOULDN'T BE RESPONSIBLE FOR.    [employer]    [facility]  [phone]

## 2021-01-26 NOTE — TELEPHONE ENCOUNTER
PT IS REQUESTING A CALL BACK REGARDING A BILL FROM AN OUTSIDE LAB THAT SHE KEEPS GETTING LATE NOTICES FROM THAT SHE SHOULDN'T BE RESPONSIBLE FOR.    [employer]    [facility]  [phone]    [employer] representative spoke with patient.

## 2021-02-02 ENCOUNTER — OFFICE VISIT (OUTPATIENT)
Dept: FAMILY MEDICINE CLINIC | Facility: CLINIC | Age: 46
End: 2021-02-02

## 2021-02-02 VITALS
OXYGEN SATURATION: 98 % | TEMPERATURE: 97.3 F | HEIGHT: 66 IN | SYSTOLIC BLOOD PRESSURE: 126 MMHG | WEIGHT: 267 LBS | HEART RATE: 80 BPM | BODY MASS INDEX: 42.91 KG/M2 | DIASTOLIC BLOOD PRESSURE: 74 MMHG

## 2021-02-02 DIAGNOSIS — Z13.220 ENCOUNTER FOR LIPID SCREENING FOR CARDIOVASCULAR DISEASE: ICD-10-CM

## 2021-02-02 DIAGNOSIS — Z13.6 ENCOUNTER FOR LIPID SCREENING FOR CARDIOVASCULAR DISEASE: ICD-10-CM

## 2021-02-02 DIAGNOSIS — E55.9 VITAMIN D DEFICIENCY: ICD-10-CM

## 2021-02-02 DIAGNOSIS — J30.89 SEASONAL ALLERGIC RHINITIS DUE TO OTHER ALLERGIC TRIGGER: ICD-10-CM

## 2021-02-02 DIAGNOSIS — I10 ESSENTIAL HYPERTENSION: ICD-10-CM

## 2021-02-02 DIAGNOSIS — D17.21 LIPOMA OF RIGHT UPPER EXTREMITY: Primary | ICD-10-CM

## 2021-02-02 DIAGNOSIS — E53.8 VITAMIN B12 DEFICIENCY: ICD-10-CM

## 2021-02-02 PROCEDURE — 99214 OFFICE O/P EST MOD 30 MIN: CPT | Performed by: FAMILY MEDICINE

## 2021-02-02 RX ORDER — MONTELUKAST SODIUM 10 MG/1
10 TABLET ORAL NIGHTLY
Qty: 90 TABLET | Refills: 1 | Status: SHIPPED | OUTPATIENT
Start: 2021-02-02 | End: 2021-08-02 | Stop reason: SDUPTHER

## 2021-02-02 RX ORDER — LISINOPRIL 20 MG/1
20 TABLET ORAL DAILY
Qty: 90 TABLET | Refills: 1 | Status: SHIPPED | OUTPATIENT
Start: 2021-02-02 | End: 2021-08-02 | Stop reason: SDUPTHER

## 2021-02-03 ENCOUNTER — LAB (OUTPATIENT)
Dept: FAMILY MEDICINE CLINIC | Facility: CLINIC | Age: 46
End: 2021-02-03

## 2021-02-03 DIAGNOSIS — E53.8 VITAMIN B12 DEFICIENCY: ICD-10-CM

## 2021-02-03 DIAGNOSIS — I10 ESSENTIAL HYPERTENSION: ICD-10-CM

## 2021-02-03 DIAGNOSIS — Z13.6 ENCOUNTER FOR LIPID SCREENING FOR CARDIOVASCULAR DISEASE: ICD-10-CM

## 2021-02-03 DIAGNOSIS — Z13.220 ENCOUNTER FOR LIPID SCREENING FOR CARDIOVASCULAR DISEASE: ICD-10-CM

## 2021-02-03 DIAGNOSIS — E55.9 VITAMIN D DEFICIENCY: ICD-10-CM

## 2021-02-03 PROCEDURE — 80061 LIPID PANEL: CPT | Performed by: FAMILY MEDICINE

## 2021-02-03 PROCEDURE — 82607 VITAMIN B-12: CPT | Performed by: FAMILY MEDICINE

## 2021-02-03 PROCEDURE — 84443 ASSAY THYROID STIM HORMONE: CPT | Performed by: FAMILY MEDICINE

## 2021-02-03 PROCEDURE — 80053 COMPREHEN METABOLIC PANEL: CPT | Performed by: FAMILY MEDICINE

## 2021-02-03 PROCEDURE — 82306 VITAMIN D 25 HYDROXY: CPT | Performed by: FAMILY MEDICINE

## 2021-02-03 PROCEDURE — 36415 COLL VENOUS BLD VENIPUNCTURE: CPT

## 2021-02-03 NOTE — PROGRESS NOTES
"Nilay Xiao     VITALS: Blood pressure 126/74, pulse 80, temperature 97.3 °F (36.3 °C), temperature source Temporal, height 167.6 cm (65.98\"), weight 121 kg (267 lb), SpO2 98 %, not currently breastfeeding.    Subjective  Chief Complaint  Hypertension    Subjective          History of Present Illness:  Patient is a 45 y.o.  female with medical conditions significant for hypertension, allergic rhinitis, vitamin D deficiency, vitamin B12 deficiency who presents to clinic secondary to medical followup.  No new or acute concerns.  Patient is doing well.    No complaints about any of the medications.    The following portions of the patient's history were reviewed and updated as appropriate: allergies, current medications, past family history, past medical history, past social history, past surgical history and problem list.    Past Medical History  Past Medical History:   Diagnosis Date   • GERD (gastroesophageal reflux disease)    • History of Papanicolaou smear of cervix 06/2016       Surgical History  Past Surgical History:   Procedure Laterality Date   • CYST REMOVAL      from jawbone   • NO PAST SURGERIES     • WISDOM TOOTH EXTRACTION         Family History  Family History   Problem Relation Age of Onset   • Diabetes Father    • Kidney nephrosis Mother    • Cancer Maternal Aunt    • Cancer Paternal Uncle        Social History  Social History     Socioeconomic History   • Marital status:      Spouse name: Not on file   • Number of children: Not on file   • Years of education: Not on file   • Highest education level: Not on file   Tobacco Use   • Smoking status: Never Smoker   • Smokeless tobacco: Never Used   Substance and Sexual Activity   • Alcohol use: No   • Drug use: No   • Sexual activity: Yes     Partners: Male     Birth control/protection: OCP       Objective   Vital Signs:   /74 (BP Location: Right arm, Patient Position: Sitting, Cuff Size: Adult)   Pulse 80   Temp 97.3 °F (36.3 °C) (Temporal) " "  Ht 167.6 cm (65.98\")   Wt 121 kg (267 lb)   SpO2 98%   BMI 43.12 kg/m²     Physical Exam     Gen: Patient in NAD. Pleasant and answers appropriately. A&Ox3.    Skin: Warm and dry with normal turgor. No purpura, rashes, or unusual pigmentation noted. Hair is normal in appearance and distribution.    HEENT: NC/AT. No lesions noted. Conjunctiva clear, sclera nonicteric. PERRL. EOMI without nystagmus or strabismus. Fundi appear benign. No hemorrhages or exudates of eyes. Auditory canals are patent bilaterally without lesions. TMs intact,  nonerythematous, nonbulging without lesions. Nasal mucosa pink, nonerythematous, and nonedematous. Frontal and maxillary sinuses are nontender. O/P nonerythematous and moist without exudate.    Neck: Supple without lymph nodes palpated. FROM.     Lungs: CTA B/L without rales, rhonchi, crackles, or wheezes.    Heart: RRR. S1 and S2 normal. No S3 or S4. No MRGT.    Abd: Soft, nontender,nondistended. (+)BSx4 quadrants.     Extrem: No CCE. Radial pulses 2+/4 and equal B/L. FROMx4. No bone, joint, or muscle tenderness noted.  Lipoma over right upper extremity.    Neuro: No focal motor/sensory deficits.    Procedures    Result Review :   The following data was reviewed by: Stephenie Ewing MD on 02/02/2021:       Vitamin D 25 Hydroxy (08/19/2019 10:20)  Vitamin B12 (08/19/2019 10:20)  Lipid Panel (08/19/2019 10:20)  Comprehensive Metabolic Panel (08/19/2019 10:20)           Assessment and Plan    Nilay Xiao is a 45 y.o. here for medical followup.    Problem List Items Addressed This Visit     None      Visit Diagnoses     Essential hypertension    -  Primary    Relevant Medications    lisinopril (PRINIVIL,ZESTRIL) 20 MG tablet    Other Relevant Orders    Comprehensive Metabolic Panel    TSH    Seasonal allergic rhinitis due to other allergic trigger        Relevant Medications    montelukast (Singulair) 10 MG tablet    Lipoma of right upper extremity        Relevant Orders    " Ambulatory Referral to General Surgery    Referral to surgery for removal.    Encounter for lipid screening for cardiovascular disease        Relevant Orders    Lipid Panel    Vitamin D deficiency        Relevant Orders    Vitamin D 25 Hydroxy    Vitamin B12 deficiency        Relevant Orders    Vitamin B12            Patient's Body mass index is 43.12 kg/m². BMI is above normal parameters. Recommendations include: exercise counseling and nutrition counseling.                   Follow Up   Return in about 6 months (around 8/2/2021).  Findings and plans discussed with patient who verbalizes understanding and agreement. Will followup with patient once results are in. Patient was given instructions and counseling regarding her condition or for health maintenance advice. Please see specific information pulled into the AVS if appropriate.       Stephenie Ewing MD    EMR Dragon/Transcription Disclaimer:  Much of this encounter note is an electronic transcription/translation of spoken language to printed text.

## 2021-02-04 LAB
25(OH)D3 SERPL-MCNC: 34.4 NG/ML (ref 30–100)
ALBUMIN SERPL-MCNC: 4.3 G/DL (ref 3.5–5.2)
ALBUMIN/GLOB SERPL: 2.9 G/DL
ALP SERPL-CCNC: 69 U/L (ref 39–117)
ALT SERPL W P-5'-P-CCNC: 15 U/L (ref 1–33)
ANION GAP SERPL CALCULATED.3IONS-SCNC: 11.2 MMOL/L (ref 5–15)
AST SERPL-CCNC: 15 U/L (ref 1–32)
BILIRUB SERPL-MCNC: 0.3 MG/DL (ref 0–1.2)
BUN SERPL-MCNC: 10 MG/DL (ref 6–20)
BUN/CREAT SERPL: 14.7 (ref 7–25)
CALCIUM SPEC-SCNC: 9.5 MG/DL (ref 8.6–10.5)
CHLORIDE SERPL-SCNC: 105 MMOL/L (ref 98–107)
CHOLEST SERPL-MCNC: 174 MG/DL (ref 0–200)
CO2 SERPL-SCNC: 23.8 MMOL/L (ref 22–29)
CREAT SERPL-MCNC: 0.68 MG/DL (ref 0.57–1)
GFR SERPL CREATININE-BSD FRML MDRD: 94 ML/MIN/1.73
GLOBULIN UR ELPH-MCNC: 1.5 GM/DL
GLUCOSE SERPL-MCNC: 97 MG/DL (ref 65–99)
HDLC SERPL-MCNC: 51 MG/DL (ref 40–60)
LDLC SERPL CALC-MCNC: 109 MG/DL (ref 0–100)
LDLC/HDLC SERPL: 2.13 {RATIO}
POTASSIUM SERPL-SCNC: 4.2 MMOL/L (ref 3.5–5.2)
PROT SERPL-MCNC: 5.8 G/DL (ref 6–8.5)
SODIUM SERPL-SCNC: 140 MMOL/L (ref 136–145)
TRIGL SERPL-MCNC: 73 MG/DL (ref 0–150)
TSH SERPL DL<=0.05 MIU/L-ACNC: 4.07 UIU/ML (ref 0.27–4.2)
VIT B12 BLD-MCNC: 710 PG/ML (ref 211–946)
VLDLC SERPL-MCNC: 14 MG/DL (ref 5–40)

## 2021-02-10 ENCOUNTER — TELEPHONE (OUTPATIENT)
Dept: FAMILY MEDICINE CLINIC | Facility: CLINIC | Age: 46
End: 2021-02-10

## 2021-02-10 NOTE — TELEPHONE ENCOUNTER
----- Message from Stephenie Ewing MD sent at 2/10/2021  8:23 AM EST -----  Labs stable. Okay to either call or send letter to patient. Thanks.      Stable letter mailed.

## 2021-03-11 ENCOUNTER — OFFICE VISIT (OUTPATIENT)
Dept: SURGERY | Facility: CLINIC | Age: 46
End: 2021-03-11

## 2021-03-11 VITALS
HEART RATE: 84 BPM | DIASTOLIC BLOOD PRESSURE: 92 MMHG | SYSTOLIC BLOOD PRESSURE: 147 MMHG | HEIGHT: 65 IN | BODY MASS INDEX: 44.68 KG/M2 | WEIGHT: 268.2 LBS

## 2021-03-11 DIAGNOSIS — D17.1 LIPOMA OF TORSO: Primary | ICD-10-CM

## 2021-03-11 PROCEDURE — 99242 OFF/OP CONSLTJ NEW/EST SF 20: CPT | Performed by: SURGERY

## 2021-03-11 RX ORDER — CLINDAMYCIN PHOSPHATE 900 MG/50ML
900 INJECTION INTRAVENOUS ONCE
Status: CANCELLED | OUTPATIENT
Start: 2021-03-17 | End: 2021-03-11

## 2021-03-11 NOTE — PROGRESS NOTES
"Subjective   Nilay Xiao is a 45 y.o. female here today to check place on right shoulder.    History of Present Illness  Ms. Xiao was seen in the office today for a symptomatic lipoma of the right shoulder.  This has been present for 2 years but has gotten larger and causes discomfort in that it is right over her bra strap which causes discomfort.  Allergies   Allergen Reactions   • Ampicillin Rash     Current Outpatient Medications   Medication Sig Dispense Refill   • acetaminophen (TYLENOL) 500 MG tablet Take 500 mg by mouth Every 6 (Six) Hours As Needed for Mild Pain .     • ibuprofen (ADVIL,MOTRIN) 200 MG tablet Take 200 mg by mouth Every 8 (Eight) Hours As Needed for Mild Pain .     • Ibuprofen (MIDOL) 200 MG capsule Take  by mouth.     • lisinopril (PRINIVIL,ZESTRIL) 20 MG tablet Take 1 tablet by mouth Daily. 90 tablet 1   • montelukast (Singulair) 10 MG tablet Take 1 tablet by mouth Every Night. 90 tablet 1   • norethindrone (MICRONOR) 0.35 MG tablet Take 1 tablet by mouth Daily. 28 tablet 12   • Pseudoephedrine HCl (SINUS & ALLERGY 12 HOUR PO) Take  by mouth.     • vitamin D (ERGOCALCIFEROL) 98283 units capsule capsule Take 1 capsule by mouth 1 (One) Time Per Week. 8 capsule 0     No current facility-administered medications for this visit.     Past Medical History:   Diagnosis Date   • GERD (gastroesophageal reflux disease)    • History of Papanicolaou smear of cervix 06/2016   • Hypertension      Past Surgical History:   Procedure Laterality Date   • CYST REMOVAL      from jawbone   • NO PAST SURGERIES     • WISDOM TOOTH EXTRACTION         Pertinent Review of Systems:  Respiratory: no shortness of breath  Cardiovascular: no chest pain  Other pertinent:      Objective   Ht 165.1 cm (65\")   Wt 122 kg (268 lb 3.2 oz)   BMI 44.63 kg/m²   Physical Exam  General:  This is a WD WN female in no acute distress  Lungs:  Respiratory effort normal. Auscultation: Clear, without wheezes, rhonchi, rales  Heart:  Regular " rate and rhythm, without murmur, gallop, rub.  No pedal edema  Skin: On the right superior shoulder there is a subcutaneous mass measuring 5.6 x 3 cm consistent with lipoma    Results/Data:  Imaging:   Notes:   Lab:   Other:     Assessment/Plan   Symptomatic lipoma of the right shoulder    Proceed with surgical excision.  Given the large size this will be done in the operating room       Discussion/Summary    Time spent:     Patient's Body mass index is 44.63 kg/m². BMI is above normal parameters. Recommendations include: educational material.       Future Appointments   Date Time Provider Department Center   3/11/2021 10:00 AM Sidra Mackey MD [facility]   8/2/2021  3:45 PM Stephenie Ewing MD [facility]         Please note that portions of this note were completed with a voice recognition program.

## 2021-03-12 ENCOUNTER — TELEPHONE (OUTPATIENT)
Dept: SURGERY | Facility: CLINIC | Age: 46
End: 2021-03-12

## 2021-03-12 DIAGNOSIS — Z01.818 PRE-OP TESTING: Primary | ICD-10-CM

## 2021-03-15 ENCOUNTER — APPOINTMENT (OUTPATIENT)
Dept: PREADMISSION TESTING | Facility: HOSPITAL | Age: 46
End: 2021-03-15

## 2021-03-15 ENCOUNTER — LAB (OUTPATIENT)
Dept: LAB | Facility: HOSPITAL | Age: 46
End: 2021-03-15

## 2021-03-15 DIAGNOSIS — Z01.818 PRE-OP TESTING: ICD-10-CM

## 2021-03-15 DIAGNOSIS — D17.1 LIPOMA OF TORSO: ICD-10-CM

## 2021-03-15 LAB
ANION GAP SERPL CALCULATED.3IONS-SCNC: 10.5 MMOL/L (ref 5–15)
BUN SERPL-MCNC: 12 MG/DL (ref 6–20)
BUN/CREAT SERPL: 15.6 (ref 7–25)
CALCIUM SPEC-SCNC: 9.3 MG/DL (ref 8.6–10.5)
CHLORIDE SERPL-SCNC: 106 MMOL/L (ref 98–107)
CO2 SERPL-SCNC: 24.5 MMOL/L (ref 22–29)
CREAT SERPL-MCNC: 0.77 MG/DL (ref 0.57–1)
DEPRECATED RDW RBC AUTO: 46 FL (ref 37–54)
ERYTHROCYTE [DISTWIDTH] IN BLOOD BY AUTOMATED COUNT: 13.1 % (ref 12.3–15.4)
GFR SERPL CREATININE-BSD FRML MDRD: 82 ML/MIN/1.73
GLUCOSE SERPL-MCNC: 93 MG/DL (ref 65–99)
HCT VFR BLD AUTO: 42.1 % (ref 34–46.6)
HGB BLD-MCNC: 13.2 G/DL (ref 12–15.9)
MCH RBC QN AUTO: 29.9 PG (ref 26.6–33)
MCHC RBC AUTO-ENTMCNC: 31.4 G/DL (ref 31.5–35.7)
MCV RBC AUTO: 95.2 FL (ref 79–97)
PLATELET # BLD AUTO: 257 10*3/MM3 (ref 140–450)
PMV BLD AUTO: 11.1 FL (ref 6–12)
POTASSIUM SERPL-SCNC: 4.1 MMOL/L (ref 3.5–5.2)
RBC # BLD AUTO: 4.42 10*6/MM3 (ref 3.77–5.28)
SARS-COV-2 RNA NOSE QL NAA+PROBE: NOT DETECTED
SODIUM SERPL-SCNC: 141 MMOL/L (ref 136–145)
WBC # BLD AUTO: 7.61 10*3/MM3 (ref 3.4–10.8)

## 2021-03-15 PROCEDURE — 36415 COLL VENOUS BLD VENIPUNCTURE: CPT

## 2021-03-15 PROCEDURE — 85027 COMPLETE CBC AUTOMATED: CPT

## 2021-03-15 PROCEDURE — 80048 BASIC METABOLIC PNL TOTAL CA: CPT

## 2021-03-15 PROCEDURE — U0004 COV-19 TEST NON-CDC HGH THRU: HCPCS | Performed by: SURGERY

## 2021-03-15 RX ORDER — CHOLECALCIFEROL (VITAMIN D3) 125 MCG
500 CAPSULE ORAL DAILY
COMMUNITY
End: 2022-02-02

## 2021-03-15 NOTE — H&P
"Subjective   Nilay Xiao is a 45 y.o. female here today to check place on right shoulder.    History of Present Illness  Ms. Xiao was seen in the office today for a symptomatic lipoma of the right shoulder.  This has been present for 2 years but has gotten larger and causes discomfort in that it is right over her bra strap which causes discomfort.  Allergies   Allergen Reactions   • Ampicillin Rash     Current Outpatient Medications   Medication Sig Dispense Refill   • acetaminophen (TYLENOL) 500 MG tablet Take 500 mg by mouth Every 6 (Six) Hours As Needed for Mild Pain .     • ibuprofen (ADVIL,MOTRIN) 200 MG tablet Take 200 mg by mouth Every 8 (Eight) Hours As Needed for Mild Pain .     • Ibuprofen (MIDOL) 200 MG capsule Take  by mouth.     • lisinopril (PRINIVIL,ZESTRIL) 20 MG tablet Take 1 tablet by mouth Daily. 90 tablet 1   • montelukast (Singulair) 10 MG tablet Take 1 tablet by mouth Every Night. 90 tablet 1   • norethindrone (MICRONOR) 0.35 MG tablet Take 1 tablet by mouth Daily. 28 tablet 12   • Pseudoephedrine HCl (SINUS & ALLERGY 12 HOUR PO) Take  by mouth.     • vitamin D (ERGOCALCIFEROL) 39179 units capsule capsule Take 1 capsule by mouth 1 (One) Time Per Week. 8 capsule 0     No current facility-administered medications for this visit.     Past Medical History:   Diagnosis Date   • GERD (gastroesophageal reflux disease)    • History of Papanicolaou smear of cervix 06/2016   • Hypertension      Past Surgical History:   Procedure Laterality Date   • CYST REMOVAL      from jawbone   • NO PAST SURGERIES     • WISDOM TOOTH EXTRACTION         Pertinent Review of Systems:  Respiratory: no shortness of breath  Cardiovascular: no chest pain  Other pertinent:      Objective   Ht 165.1 cm (65\")   Wt 122 kg (268 lb 3.2 oz)   BMI 44.63 kg/m²   Physical Exam  General:  This is a WD WN female in no acute distress  Lungs:  Respiratory effort normal. Auscultation: Clear, without wheezes, rhonchi, rales  Heart:  Regular " rate and rhythm, without murmur, gallop, rub.  No pedal edema  Skin: On the right superior shoulder there is a subcutaneous mass measuring 5.6 x 3 cm consistent with lipoma    Results/Data:  Imaging:   Notes:   Lab:   Other:     Assessment/Plan   Symptomatic lipoma of the right shoulder    Proceed with surgical excision.  Given the large size this will be done in the operating room       Discussion/Summary    Time spent:     Patient's Body mass index is 44.63 kg/m². BMI is above normal parameters. Recommendations include: educational material.       Future Appointments   Date Time Provider Department Center   3/11/2021 10:00 AM Sidra Rodriguez MD [facility] [place] [facility]   8/2/2021  3:45 PM Stephenie Ewing MD [facility]         Please note that portions of this note were completed with a voice recognition program.    This document has been electronically signed by Sidra RODRIGUEZ MD on March 15, 2021 09:03 EDT

## 2021-03-16 ENCOUNTER — TELEPHONE (OUTPATIENT)
Dept: SURGERY | Facility: CLINIC | Age: 46
End: 2021-03-16

## 2021-03-17 ENCOUNTER — HOSPITAL ENCOUNTER (OUTPATIENT)
Facility: HOSPITAL | Age: 46
Setting detail: HOSPITAL OUTPATIENT SURGERY
Discharge: HOME | End: 2021-03-17
Attending: SURGERY | Admitting: SURGERY

## 2021-03-17 ENCOUNTER — ANESTHESIA (OUTPATIENT)
Dept: PERIOP | Facility: HOSPITAL | Age: 46
End: 2021-03-17

## 2021-03-17 ENCOUNTER — ANESTHESIA EVENT (OUTPATIENT)
Dept: PERIOP | Facility: HOSPITAL | Age: 46
End: 2021-03-17

## 2021-03-17 VITALS
RESPIRATION RATE: 18 BRPM | WEIGHT: 268.8 LBS | HEIGHT: 65 IN | HEART RATE: 71 BPM | SYSTOLIC BLOOD PRESSURE: 118 MMHG | OXYGEN SATURATION: 100 % | TEMPERATURE: 97.8 F | BODY MASS INDEX: 44.79 KG/M2 | DIASTOLIC BLOOD PRESSURE: 65 MMHG

## 2021-03-17 DIAGNOSIS — E66.01 MORBIDLY OBESE (HCC): ICD-10-CM

## 2021-03-17 DIAGNOSIS — D17.1 LIPOMA OF TORSO: ICD-10-CM

## 2021-03-17 LAB
B-HCG UR QL: NEGATIVE
INTERNAL NEGATIVE CONTROL: NEGATIVE
INTERNAL POSITIVE CONTROL: POSITIVE
Lab: NORMAL

## 2021-03-17 PROCEDURE — 25010000002 KETOROLAC TROMETHAMINE PER 15 MG: Performed by: NURSE ANESTHETIST, CERTIFIED REGISTERED

## 2021-03-17 PROCEDURE — 25010000002 PROPOFOL 10 MG/ML EMULSION: Performed by: NURSE ANESTHETIST, CERTIFIED REGISTERED

## 2021-03-17 PROCEDURE — 25010000002 FENTANYL CITRATE (PF) 100 MCG/2ML SOLUTION: Performed by: NURSE ANESTHETIST, CERTIFIED REGISTERED

## 2021-03-17 PROCEDURE — 25010000002 ONDANSETRON PER 1 MG: Performed by: NURSE ANESTHETIST, CERTIFIED REGISTERED

## 2021-03-17 PROCEDURE — 81025 URINE PREGNANCY TEST: CPT | Performed by: ANESTHESIOLOGY

## 2021-03-17 PROCEDURE — 25010000002 MIDAZOLAM PER 1 MG: Performed by: ANESTHESIOLOGY

## 2021-03-17 PROCEDURE — 12032 INTMD RPR S/A/T/EXT 2.6-7.5: CPT | Performed by: SURGERY

## 2021-03-17 PROCEDURE — 25010000002 DROPERIDOL PER 5 MG: Performed by: NURSE ANESTHETIST, CERTIFIED REGISTERED

## 2021-03-17 PROCEDURE — 11406 EXC TR-EXT B9+MARG >4.0 CM: CPT | Performed by: SURGERY

## 2021-03-17 RX ORDER — BUPIVACAINE HYDROCHLORIDE AND EPINEPHRINE 5; 5 MG/ML; UG/ML
INJECTION, SOLUTION EPIDURAL; INTRACAUDAL; PERINEURAL AS NEEDED
Status: DISCONTINUED | OUTPATIENT
Start: 2021-03-17 | End: 2021-03-17 | Stop reason: HOSPADM

## 2021-03-17 RX ORDER — IPRATROPIUM BROMIDE AND ALBUTEROL SULFATE 2.5; .5 MG/3ML; MG/3ML
3 SOLUTION RESPIRATORY (INHALATION) ONCE AS NEEDED
Status: DISCONTINUED | OUTPATIENT
Start: 2021-03-17 | End: 2021-03-17 | Stop reason: HOSPADM

## 2021-03-17 RX ORDER — SODIUM CHLORIDE, SODIUM LACTATE, POTASSIUM CHLORIDE, CALCIUM CHLORIDE 600; 310; 30; 20 MG/100ML; MG/100ML; MG/100ML; MG/100ML
100 INJECTION, SOLUTION INTRAVENOUS ONCE AS NEEDED
Status: DISCONTINUED | OUTPATIENT
Start: 2021-03-17 | End: 2021-03-17 | Stop reason: HOSPADM

## 2021-03-17 RX ORDER — CLINDAMYCIN PHOSPHATE 900 MG/50ML
900 INJECTION INTRAVENOUS ONCE
Status: COMPLETED | OUTPATIENT
Start: 2021-03-17 | End: 2021-03-17

## 2021-03-17 RX ORDER — SODIUM CHLORIDE, SODIUM LACTATE, POTASSIUM CHLORIDE, CALCIUM CHLORIDE 600; 310; 30; 20 MG/100ML; MG/100ML; MG/100ML; MG/100ML
INJECTION, SOLUTION INTRAVENOUS CONTINUOUS PRN
Status: DISCONTINUED | OUTPATIENT
Start: 2021-03-17 | End: 2021-03-17 | Stop reason: SURG

## 2021-03-17 RX ORDER — SODIUM CHLORIDE, SODIUM LACTATE, POTASSIUM CHLORIDE, CALCIUM CHLORIDE 600; 310; 30; 20 MG/100ML; MG/100ML; MG/100ML; MG/100ML
125 INJECTION, SOLUTION INTRAVENOUS ONCE
Status: COMPLETED | OUTPATIENT
Start: 2021-03-17 | End: 2021-03-17

## 2021-03-17 RX ORDER — MEPERIDINE HYDROCHLORIDE 25 MG/ML
12.5 INJECTION INTRAMUSCULAR; INTRAVENOUS; SUBCUTANEOUS
Status: DISCONTINUED | OUTPATIENT
Start: 2021-03-17 | End: 2021-03-17 | Stop reason: HOSPADM

## 2021-03-17 RX ORDER — KETOROLAC TROMETHAMINE 30 MG/ML
30 INJECTION, SOLUTION INTRAMUSCULAR; INTRAVENOUS EVERY 6 HOURS PRN
Status: COMPLETED | OUTPATIENT
Start: 2021-03-17 | End: 2021-03-17

## 2021-03-17 RX ORDER — SODIUM CHLORIDE 0.9 % (FLUSH) 0.9 %
10 SYRINGE (ML) INJECTION AS NEEDED
Status: DISCONTINUED | OUTPATIENT
Start: 2021-03-17 | End: 2021-03-17 | Stop reason: HOSPADM

## 2021-03-17 RX ORDER — MIDAZOLAM HYDROCHLORIDE 1 MG/ML
1 INJECTION INTRAMUSCULAR; INTRAVENOUS
Status: DISCONTINUED | OUTPATIENT
Start: 2021-03-17 | End: 2021-03-17 | Stop reason: HOSPADM

## 2021-03-17 RX ORDER — SODIUM CHLORIDE 0.9 % (FLUSH) 0.9 %
10 SYRINGE (ML) INJECTION EVERY 12 HOURS SCHEDULED
Status: DISCONTINUED | OUTPATIENT
Start: 2021-03-17 | End: 2021-03-17 | Stop reason: HOSPADM

## 2021-03-17 RX ORDER — DROPERIDOL 2.5 MG/ML
0.62 INJECTION, SOLUTION INTRAMUSCULAR; INTRAVENOUS ONCE AS NEEDED
Status: COMPLETED | OUTPATIENT
Start: 2021-03-17 | End: 2021-03-17

## 2021-03-17 RX ORDER — ONDANSETRON 2 MG/ML
INJECTION INTRAMUSCULAR; INTRAVENOUS AS NEEDED
Status: DISCONTINUED | OUTPATIENT
Start: 2021-03-17 | End: 2021-03-17 | Stop reason: SURG

## 2021-03-17 RX ORDER — FENTANYL CITRATE 50 UG/ML
50 INJECTION, SOLUTION INTRAMUSCULAR; INTRAVENOUS
Status: DISCONTINUED | OUTPATIENT
Start: 2021-03-17 | End: 2021-03-17 | Stop reason: HOSPADM

## 2021-03-17 RX ORDER — OXYCODONE HYDROCHLORIDE AND ACETAMINOPHEN 5; 325 MG/1; MG/1
1 TABLET ORAL ONCE AS NEEDED
Status: DISCONTINUED | OUTPATIENT
Start: 2021-03-17 | End: 2021-03-17 | Stop reason: HOSPADM

## 2021-03-17 RX ORDER — FAMOTIDINE 10 MG/ML
INJECTION, SOLUTION INTRAVENOUS AS NEEDED
Status: DISCONTINUED | OUTPATIENT
Start: 2021-03-17 | End: 2021-03-17 | Stop reason: SURG

## 2021-03-17 RX ORDER — HYDROCODONE BITARTRATE AND ACETAMINOPHEN 7.5; 325 MG/1; MG/1
1 TABLET ORAL 4 TIMES DAILY PRN
Qty: 8 TABLET | Refills: 0 | Status: SHIPPED | OUTPATIENT
Start: 2021-03-17 | End: 2021-08-03

## 2021-03-17 RX ORDER — ONDANSETRON 2 MG/ML
4 INJECTION INTRAMUSCULAR; INTRAVENOUS AS NEEDED
Status: DISCONTINUED | OUTPATIENT
Start: 2021-03-17 | End: 2021-03-17 | Stop reason: HOSPADM

## 2021-03-17 RX ORDER — MAGNESIUM HYDROXIDE 1200 MG/15ML
LIQUID ORAL AS NEEDED
Status: DISCONTINUED | OUTPATIENT
Start: 2021-03-17 | End: 2021-03-17 | Stop reason: HOSPADM

## 2021-03-17 RX ORDER — FENTANYL CITRATE 50 UG/ML
INJECTION, SOLUTION INTRAMUSCULAR; INTRAVENOUS AS NEEDED
Status: DISCONTINUED | OUTPATIENT
Start: 2021-03-17 | End: 2021-03-17 | Stop reason: SURG

## 2021-03-17 RX ORDER — PROPOFOL 10 MG/ML
VIAL (ML) INTRAVENOUS AS NEEDED
Status: DISCONTINUED | OUTPATIENT
Start: 2021-03-17 | End: 2021-03-17 | Stop reason: SURG

## 2021-03-17 RX ORDER — MIDAZOLAM HYDROCHLORIDE 1 MG/ML
2 INJECTION INTRAMUSCULAR; INTRAVENOUS
Status: DISCONTINUED | OUTPATIENT
Start: 2021-03-17 | End: 2021-03-17 | Stop reason: HOSPADM

## 2021-03-17 RX ADMIN — CLINDAMYCIN PHOSPHATE 900 MG: 900 INJECTION, SOLUTION INTRAVENOUS at 09:53

## 2021-03-17 RX ADMIN — FAMOTIDINE 20 MG: 10 INJECTION INTRAVENOUS at 09:53

## 2021-03-17 RX ADMIN — ONDANSETRON 4 MG: 2 INJECTION INTRAMUSCULAR; INTRAVENOUS at 09:53

## 2021-03-17 RX ADMIN — FENTANYL CITRATE 100 MCG: 50 INJECTION INTRAMUSCULAR; INTRAVENOUS at 09:53

## 2021-03-17 RX ADMIN — PROPOFOL 50 MG: 10 INJECTION, EMULSION INTRAVENOUS at 09:56

## 2021-03-17 RX ADMIN — SODIUM CHLORIDE, POTASSIUM CHLORIDE, SODIUM LACTATE AND CALCIUM CHLORIDE: 600; 310; 30; 20 INJECTION, SOLUTION INTRAVENOUS at 09:53

## 2021-03-17 RX ADMIN — MIDAZOLAM HYDROCHLORIDE 2 MG: 1 INJECTION, SOLUTION INTRAMUSCULAR; INTRAVENOUS at 09:53

## 2021-03-17 RX ADMIN — DROPERIDOL 0.62 MG: 2.5 INJECTION, SOLUTION INTRAMUSCULAR; INTRAVENOUS at 11:12

## 2021-03-17 RX ADMIN — KETOROLAC TROMETHAMINE 30 MG: 30 INJECTION, SOLUTION INTRAMUSCULAR at 11:16

## 2021-03-17 RX ADMIN — SODIUM CHLORIDE, POTASSIUM CHLORIDE, SODIUM LACTATE AND CALCIUM CHLORIDE 125 ML/HR: 600; 310; 30; 20 INJECTION, SOLUTION INTRAVENOUS at 09:25

## 2021-03-17 NOTE — ANESTHESIA PROCEDURE NOTES
Airway  Urgency: elective    Date/Time: 3/17/2021 9:53 AM  Airway not difficult    General Information and Staff    Patient location during procedure: OR  Anesthesiologist: Edgar Da Silva MD  CRNA: Robby Page CRNA    Indications and Patient Condition    Preoxygenated: yes  MILS not maintained throughout  Mask difficulty assessment: 0 - not attempted    Final Airway Details  Final airway type: supraglottic airway      Successful airway: unique  Size 4    Number of attempts at approach: 1  Assessment: lips, teeth, and gum same as pre-op and atraumatic intubation    Additional Comments  Atraumatic LMA placement, dentition unchanged.

## 2021-03-17 NOTE — ANESTHESIA POSTPROCEDURE EVALUATION
Patient: Nilay Xiao    Procedure Summary     Date: 03/17/21 Room / Location: [facility] OR 02 /  [facility] OR    Anesthesia Start: 0953 Anesthesia Stop: 1032    Procedure: EXCISION LESION SHOULDER (Right Abdomen) Diagnosis:       Lipoma of torso      (Lipoma of torso [D17.1])    Surgeons: Sidra Mackey MD Provider: Edgar Da Silva MD    Anesthesia Type: general ASA Status: 3          Anesthesia Type: general    Vitals  Vitals Value Taken Time   /66 03/17/21 1103   Temp 97.6 °F (36.4 °C) 03/17/21 1033   Pulse 69 03/17/21 1103   Resp 13 03/17/21 1103   SpO2 100 % 03/17/21 1103           Post Anesthesia Care and Evaluation    Patient location during evaluation: bedside  Patient participation: complete - patient participated  Level of consciousness: awake and alert  Pain score: 1  Pain management: adequate  Airway patency: patent  Anesthetic complications: No anesthetic complications  PONV Status: none  Cardiovascular status: acceptable  Respiratory status: acceptable  Hydration status: acceptable

## 2021-03-17 NOTE — ANESTHESIA POSTPROCEDURE EVALUATION
Patient: Nilay Xiao    Procedure Summary     Date: 03/17/21 Room / Location: [facility] OR 02 /  [facility] OR    Anesthesia Start: 0953 Anesthesia Stop: 1032    Procedure: EXCISION LESION SHOULDER (Right Abdomen) Diagnosis:       Lipoma of torso      (Lipoma of torso [D17.1])    Surgeons: Sidra Mackey MD Provider: Edgar Da Silva MD    Anesthesia Type: general ASA Status: 3          Anesthesia Type: general    Vitals  Vitals Value Taken Time   /66 03/17/21 1103   Temp 97.6 °F (36.4 °C) 03/17/21 1033   Pulse 69 03/17/21 1103   Resp 13 03/17/21 1103   SpO2 100 % 03/17/21 1103           Post Anesthesia Care and Evaluation    Patient location during evaluation: PACU  Patient participation: complete - patient participated  Pain score: 3  Pain management: satisfactory to patient  Airway patency: patent  Anesthetic complications: No anesthetic complications  PONV Status: none  Cardiovascular status: hemodynamically stable  Respiratory status: acceptable  Hydration status: acceptable

## 2021-03-17 NOTE — ANESTHESIA PREPROCEDURE EVALUATION
Anesthesia Evaluation     Patient summary reviewed and Nursing notes reviewed   NPO Solid Status: > 8 hours  NPO Liquid Status: > 8 hours           Airway   Mallampati: II  TM distance: >3 FB  Neck ROM: full  No difficulty expected  Dental - normal exam     Pulmonary     breath sounds clear to auscultation  Cardiovascular   Exercise tolerance: excellent (>7 METS)    Rhythm: regular  Rate: normal    (+) hypertension,       Neuro/Psych  GI/Hepatic/Renal/Endo    (+) obesity, morbid obesity, GERD,      Musculoskeletal     Abdominal     Abdomen: soft.   Substance History      OB/GYN          Other                        Anesthesia Plan    ASA 3     general     intravenous induction     Anesthetic plan, all risks, benefits, and alternatives have been provided, discussed and informed consent has been obtained with: patient.    Plan discussed with CRNA.

## 2021-03-22 LAB — LAB AP CASE REPORT: NORMAL

## 2021-03-23 ENCOUNTER — BULK ORDERING (OUTPATIENT)
Dept: CASE MANAGEMENT | Facility: OTHER | Age: 46
End: 2021-03-23

## 2021-03-23 DIAGNOSIS — Z23 IMMUNIZATION DUE: ICD-10-CM

## 2021-03-25 ENCOUNTER — IMMUNIZATION (OUTPATIENT)
Dept: VACCINE CLINIC | Facility: HOSPITAL | Age: 46
End: 2021-03-25

## 2021-03-25 ENCOUNTER — OFFICE VISIT (OUTPATIENT)
Dept: SURGERY | Facility: CLINIC | Age: 46
End: 2021-03-25

## 2021-03-25 VITALS
DIASTOLIC BLOOD PRESSURE: 96 MMHG | WEIGHT: 270.4 LBS | HEIGHT: 65 IN | BODY MASS INDEX: 45.05 KG/M2 | SYSTOLIC BLOOD PRESSURE: 143 MMHG | HEART RATE: 82 BPM

## 2021-03-25 DIAGNOSIS — Z09 POSTOP CHECK: ICD-10-CM

## 2021-03-25 DIAGNOSIS — Z23 IMMUNIZATION DUE: ICD-10-CM

## 2021-03-25 DIAGNOSIS — D17.1 LIPOMA OF TORSO: Primary | ICD-10-CM

## 2021-03-25 PROCEDURE — 91300 HC SARSCOV02 VAC 30MCG/0.3ML IM: CPT | Performed by: INTERNAL MEDICINE

## 2021-03-25 PROCEDURE — 0001A: CPT | Performed by: INTERNAL MEDICINE

## 2021-03-25 PROCEDURE — 99024 POSTOP FOLLOW-UP VISIT: CPT | Performed by: SURGERY

## 2021-03-25 NOTE — PROGRESS NOTES
"Subjective   Nilay Xiao is a 45 y.o. female here today for post op and pathology report.    History of Present Illness  Ms. Xiao was seen in the office today for first postoperative visit following excision of a right shoulder lipoma.  She is still having some localized soreness.  Allergies   Allergen Reactions   • Ampicillin Rash         Current Outpatient Medications   Medication Sig Dispense Refill   • acetaminophen (TYLENOL) 500 MG tablet Take 500 mg by mouth Every 6 (Six) Hours As Needed for Mild Pain .     • ibuprofen (ADVIL,MOTRIN) 200 MG tablet Take 200 mg by mouth Every 8 (Eight) Hours As Needed for Mild Pain .     • lisinopril (PRINIVIL,ZESTRIL) 20 MG tablet Take 1 tablet by mouth Daily. 90 tablet 1   • montelukast (Singulair) 10 MG tablet Take 1 tablet by mouth Every Night. 90 tablet 1   • norethindrone (MICRONOR) 0.35 MG tablet Take 1 tablet by mouth Daily. 28 tablet 12   • Pseudoephedrine HCl (SINUS & ALLERGY 12 HOUR PO) Take  by mouth.     • vitamin B-12 (CYANOCOBALAMIN) 500 MCG tablet Take 500 mcg by mouth Daily.     • vitamin D (ERGOCALCIFEROL) 84017 units capsule capsule Take 1 capsule by mouth 1 (One) Time Per Week. (Patient taking differently: Take 50,000 Units by mouth Daily.) 8 capsule 0   • HYDROcodone-acetaminophen (Norco) 7.5-325 MG per tablet Take 1 tablet by mouth 4 (Four) Times a Day As Needed for Moderate Pain . 8 tablet 0   • Ibuprofen (MIDOL) 200 MG capsule Take  by mouth.       No current facility-administered medications for this visit.       Objective   Ht 165.1 cm (65\")   Wt 123 kg (270 lb 6.4 oz)   LMP 02/25/2021 (Approximate)   BMI 45.00 kg/m²    Physical Exam  Skin: Patient has a healing scar in the right superior shoulder.  No erythema or drainage.    Results/Data  Pathology result was reviewed and discussed with the patient    Procedures     Assessment/Plan   Stable course, status post excision of shoulder lipoma    Follow-up as needed       Discussion/Summary  Patient's " Body mass index is 45 kg/m². BMI is above normal parameters. Recommendations include: educational material.       Future Appointments   Date Time Provider Department Center   4/15/2021 10:00 AM [facility]   8/2/2021  3:45 PM Stephenie Ewing MD [facility]         Please note that portions of this note were completed with a voice recognition program.

## 2021-04-15 ENCOUNTER — IMMUNIZATION (OUTPATIENT)
Dept: VACCINE CLINIC | Facility: HOSPITAL | Age: 46
End: 2021-04-15

## 2021-04-15 PROCEDURE — 0002A: CPT | Performed by: INTERNAL MEDICINE

## 2021-04-15 PROCEDURE — 91300 HC SARSCOV02 VAC 30MCG/0.3ML IM: CPT | Performed by: INTERNAL MEDICINE

## 2021-07-14 ENCOUNTER — TRANSCRIBE ORDERS (OUTPATIENT)
Dept: ADMINISTRATIVE | Facility: HOSPITAL | Age: 46
End: 2021-07-14

## 2021-07-14 DIAGNOSIS — Z12.31 SCREENING MAMMOGRAM, ENCOUNTER FOR: Primary | ICD-10-CM

## 2021-08-02 ENCOUNTER — OFFICE VISIT (OUTPATIENT)
Dept: FAMILY MEDICINE CLINIC | Facility: CLINIC | Age: 46
End: 2021-08-02

## 2021-08-02 VITALS
HEART RATE: 110 BPM | BODY MASS INDEX: 45.42 KG/M2 | SYSTOLIC BLOOD PRESSURE: 128 MMHG | TEMPERATURE: 97.1 F | OXYGEN SATURATION: 97 % | HEIGHT: 65 IN | WEIGHT: 272.6 LBS | DIASTOLIC BLOOD PRESSURE: 76 MMHG

## 2021-08-02 DIAGNOSIS — I10 ESSENTIAL HYPERTENSION: Primary | ICD-10-CM

## 2021-08-02 DIAGNOSIS — R63.5 WEIGHT GAIN: ICD-10-CM

## 2021-08-02 DIAGNOSIS — J30.89 SEASONAL ALLERGIC RHINITIS DUE TO OTHER ALLERGIC TRIGGER: ICD-10-CM

## 2021-08-02 PROCEDURE — 99214 OFFICE O/P EST MOD 30 MIN: CPT | Performed by: FAMILY MEDICINE

## 2021-08-02 RX ORDER — LISINOPRIL 20 MG/1
20 TABLET ORAL DAILY
Qty: 90 TABLET | Refills: 1 | Status: SHIPPED | OUTPATIENT
Start: 2021-08-02 | End: 2021-09-30 | Stop reason: SDUPTHER

## 2021-08-02 RX ORDER — MONTELUKAST SODIUM 10 MG/1
10 TABLET ORAL NIGHTLY
Qty: 90 TABLET | Refills: 1 | Status: SHIPPED | OUTPATIENT
Start: 2021-08-02 | End: 2022-02-02

## 2021-08-02 NOTE — PROGRESS NOTES
"Nilay Xiao     VITALS: Blood pressure 128/76, pulse 110, temperature 97.1 °F (36.2 °C), height 165.1 cm (65\"), weight 124 kg (272 lb 9.6 oz), SpO2 97 %, not currently breastfeeding.    Subjective  Chief Complaint  Hypertension    Subjective          History of Present Illness:  Patient is a 46 y.o.  female with medical conditions significant for allergic rhinitis and hypertension who presents to clinic secondary to medical followup.     The patient states that her allergies were doing well until she went to [place] and discontinued taking her allergy medication. She has not taken her allergy medication since she returned home secondary to misplacing it and her allergies have now been bad.     She states that she had a catch in her left knee and was unable to extend it approximately 1 week prior to going to [place]. She reports that she injured her left knee while in [place] and was unable to walk on it and it took approximately 2 weeks for it to heal. She states that her knee will be painful when it rains. She denies having a family history of gout. She reports that she is not active at all; however, they went hiking in [place] on uneven ground and her left knee slightly twisted and then they walked more while there.     She reports that she has taken Adipex years ago and it caused her to gain weight. She denies wanting to get a weight loss surgery. She has received her COVID vaccines.     No complaints about any of the medications.    The following portions of the patient's history were reviewed and updated as appropriate: allergies, current medications, past family history, past medical history, past social history, past surgical history and problem list.    Past Medical History  Past Medical History:   Diagnosis Date   • GERD (gastroesophageal reflux disease)    • History of Papanicolaou smear of cervix 06/2016   • Hypertension        Surgical History  Past Surgical History:   Procedure Laterality Date   • CYST " "REMOVAL      from jawbone   • ENDOSCOPY     • EYE SURGERY Bilateral     lasik   • TRUNK LESION/CYST EXCISION Right 3/17/2021    Procedure: EXCISION LESION SHOULDER;  Surgeon: Sidra Mackey MD;  Location: [facility];  Service: General;  Laterality: Right;   • WISDOM TOOTH EXTRACTION         Family History  Family History   Problem Relation Age of Onset   • Diabetes Father    • Kidney nephrosis Mother    • Cancer Maternal Aunt    • Cancer Paternal Uncle        Social History  Social History     Socioeconomic History   • Marital status:      Spouse name: Not on file   • Number of children: Not on file   • Years of education: Not on file   • Highest education level: Not on file   Tobacco Use   • Smoking status: Never Smoker   • Smokeless tobacco: Never Used   Vaping Use   • Vaping Use: Never used   Substance and Sexual Activity   • Alcohol use: Yes     Comment: socially   • Drug use: No   • Sexual activity: Yes     Partners: Male     Birth control/protection: OCP       Objective   Vital Signs:   /76 (BP Location: Right arm, Patient Position: Sitting, Cuff Size: Adult)   Pulse 110   Temp 97.1 °F (36.2 °C)   Ht 165.1 cm (65\")   Wt 124 kg (272 lb 9.6 oz)   SpO2 97%   BMI 45.36 kg/m²     Physical Exam     Gen: Patient in NAD. Pleasant and answers appropriately. A&Ox3.    Skin: Warm and dry with normal turgor. No purpura, rashes, or unusual pigmentation noted. Hair is normal in appearance and distribution.    HEENT: NC/AT. No lesions noted. Conjunctiva clear, sclera nonicteric. PERRL. EOMI without nystagmus or strabismus. Fundi appear benign. No hemorrhages or exudates of eyes. Auditory canals are patent bilaterally without lesions. TMs intact,  nonerythematous, nonbulging without lesions. Nasal mucosa pink, nonerythematous, and nonedematous. Frontal and maxillary sinuses are nontender. O/P nonerythematous and moist without exudate.    Neck: Supple without lymph nodes palpated. FROM.     Lungs: CTA B/L " Additional Complaints without rales, rhonchi, crackles, or wheezes.    Heart: RRR. S1 and S2 normal. No S3 or S4. No MRGT.    Abd: Soft, nontender,nondistended. (+)BSx4 quadrants.     Extrem: No CCE. Radial pulses 2+/4 and equal B/L. FROMx4.  Some tenderness in the left knee.    Neuro: No focal motor/sensory deficits.    Procedures       Assessment and Plan    Nilay Xiao is a 46 y.o. here for medical followup.    Problem List Items Addressed This Visit     None      Visit Diagnoses     Essential hypertension        Relevant Medications    lisinopril (PRINIVIL,ZESTRIL) 20 MG tablet    Seasonal allergic rhinitis due to other allergic trigger        Relevant Medications    montelukast (Singulair) 10 MG tablet          Weight gain                She will speak with her  and inform us which medication and often she wants to do it.          Patient's Body mass index is 45.36 kg/m². indicating that she is morbidly obese (BMI > 40 or > 35 with obesity - related health condition). Obesity-related health conditions include the following: hypertension. Obesity is unchanged. BMI is is above average; BMI management plan is completed. We discussed portion control and increasing exercise..         Follow Up   Return in about 6 months (around 2/2/2022).  Findings and plans discussed with patient who verbalizes understanding and agreement. Will followup with patient once results are in. Patient was given instructions and counseling regarding her condition or for health maintenance advice. Please see specific information pulled into the AVS if appropriate.       Scribed for Stephenie Ewing MD by Fabian Soto.  08/02/21   18:32 EDT    I have personally performed the services described in this document as transcribed by the above individual, and it is both accurate and complete.  Stephenie Ewing MD  8/23/2021  05:01 EDT

## 2021-08-17 ENCOUNTER — OFFICE VISIT (OUTPATIENT)
Dept: OBSTETRICS AND GYNECOLOGY | Facility: CLINIC | Age: 46
End: 2021-08-17

## 2021-08-17 VITALS
HEIGHT: 65 IN | DIASTOLIC BLOOD PRESSURE: 78 MMHG | SYSTOLIC BLOOD PRESSURE: 130 MMHG | WEIGHT: 274.8 LBS | BODY MASS INDEX: 45.79 KG/M2

## 2021-08-17 DIAGNOSIS — Z12.4 SCREENING FOR CERVICAL CANCER: ICD-10-CM

## 2021-08-17 DIAGNOSIS — Z01.419 ENCOUNTER FOR GYNECOLOGICAL EXAMINATION WITHOUT ABNORMAL FINDING: Primary | ICD-10-CM

## 2021-08-17 DIAGNOSIS — Z30.41 ENCOUNTER FOR SURVEILLANCE OF CONTRACEPTIVE PILLS: ICD-10-CM

## 2021-08-17 PROCEDURE — 99396 PREV VISIT EST AGE 40-64: CPT | Performed by: PHYSICIAN ASSISTANT

## 2021-08-17 RX ORDER — ACETAMINOPHEN AND CODEINE PHOSPHATE 120; 12 MG/5ML; MG/5ML
1 SOLUTION ORAL DAILY
Qty: 28 TABLET | Refills: 12 | Status: SHIPPED | OUTPATIENT
Start: 2021-08-17 | End: 2022-08-17

## 2021-08-17 NOTE — PROGRESS NOTES
Subjective   Chief Complaint   Patient presents with   • Gynecologic Exam     Last pap done 20-WNL, MMG is due, No complaints       Nilay Xiao is a 46 y.o. year old  presenting to be seen for her annual gynecological exam.   She has no complaints or concerns.  She has not schedule a screening mammogram yet but has order placed in chart.  She desires refills on her Micronor OCPs.  LMP was 4 weeks ago.      Past Medical History:   Diagnosis Date   • GERD (gastroesophageal reflux disease)    • History of Papanicolaou smear of cervix 2016   • Hypertension         Current Outpatient Medications:   •  acetaminophen (TYLENOL) 500 MG tablet, Take 500 mg by mouth Every 6 (Six) Hours As Needed for Mild Pain ., Disp: , Rfl:   •  ibuprofen (ADVIL,MOTRIN) 200 MG tablet, Take 200 mg by mouth Every 8 (Eight) Hours As Needed for Mild Pain ., Disp: , Rfl:   •  lisinopril (PRINIVIL,ZESTRIL) 20 MG tablet, Take 1 tablet by mouth Daily., Disp: 90 tablet, Rfl: 1  •  montelukast (Singulair) 10 MG tablet, Take 1 tablet by mouth Every Night., Disp: 90 tablet, Rfl: 1  •  Pseudoephedrine HCl (SINUS & ALLERGY 12 HOUR PO), Take  by mouth., Disp: , Rfl:   •  vitamin B-12 (CYANOCOBALAMIN) 500 MCG tablet, Take 500 mcg by mouth Daily., Disp: , Rfl:   •  vitamin D (ERGOCALCIFEROL) 56275 units capsule capsule, Take 1 capsule by mouth 1 (One) Time Per Week. (Patient taking differently: Take 50,000 Units by mouth Daily.), Disp: 8 capsule, Rfl: 0  •  norethindrone (MICRONOR) 0.35 MG tablet, Take 1 tablet by mouth Daily., Disp: 28 tablet, Rfl: 12   Allergies   Allergen Reactions   • Ampicillin Rash      Past Surgical History:   Procedure Laterality Date   • CYST REMOVAL      from jawbone   • ENDOSCOPY     • EYE SURGERY Bilateral     lasik   • TRUNK LESION/CYST EXCISION Right 3/17/2021    Procedure: EXCISION LESION SHOULDER;  Surgeon: Sidra Mackey MD;  Location: [facility];  Service: General;  Laterality: Right;   • WISDOM TOOTH  "EXTRACTION        Social History     Socioeconomic History   • Marital status:      Spouse name: Not on file   • Number of children: Not on file   • Years of education: Not on file   • Highest education level: Not on file   Tobacco Use   • Smoking status: Never Smoker   • Smokeless tobacco: Never Used   Vaping Use   • Vaping Use: Never used   Substance and Sexual Activity   • Alcohol use: Yes     Comment: socially   • Drug use: No   • Sexual activity: Yes     Partners: Male     Birth control/protection: OCP      Family History   Problem Relation Age of Onset   • Diabetes Father    • Kidney nephrosis Mother    • Cancer Maternal Aunt    • Cancer Paternal Uncle        Review of Systems   Constitutional: Negative for chills, diaphoresis and fever.   Gastrointestinal: Negative for abdominal pain, constipation, diarrhea, nausea and vomiting.   Genitourinary: Negative for difficulty urinating, dysuria, menstrual problem and pelvic pain.   All other systems reviewed and are negative.          Objective   /78   Ht 165.1 cm (65\")   Wt 125 kg (274 lb 12.8 oz)   LMP 07/20/2021 (Approximate)   Breastfeeding No   BMI 45.73 kg/m²     Physical Exam  Constitutional:       Appearance: Normal appearance. She is well-developed and well-groomed. She is morbidly obese.   Eyes:      General: Lids are normal.      Extraocular Movements: Extraocular movements intact.      Conjunctiva/sclera: Conjunctivae normal.   Neck:      Thyroid: No thyroid mass or thyromegaly.   Chest:      Breasts: Breasts are symmetrical.         Right: No inverted nipple, mass, nipple discharge, skin change or tenderness.         Left: No inverted nipple, mass, nipple discharge, skin change or tenderness.   Abdominal:      General: There is no distension.      Palpations: Abdomen is soft. There is no hepatomegaly or splenomegaly.      Tenderness: There is no abdominal tenderness.   Genitourinary:     Exam position: Lithotomy position.      Labia:   "       Right: No rash, tenderness or lesion.         Left: No rash, tenderness or lesion.       Urethra: No prolapse, urethral pain, urethral swelling or urethral lesion.      Vagina: No vaginal discharge, tenderness or lesions.      Cervix: No cervical motion tenderness, discharge, friability or lesion.      Uterus: Not enlarged and not tender.       Adnexa:         Right: No mass or tenderness.          Left: No mass or tenderness.     Musculoskeletal:      Cervical back: Neck supple.   Lymphadenopathy:      Upper Body:      Right upper body: No axillary adenopathy.      Left upper body: No axillary adenopathy.   Skin:     General: Skin is warm and dry.      Findings: No lesion.   Neurological:      General: No focal deficit present.      Mental Status: She is alert and oriented to person, place, and time.   Psychiatric:         Attention and Perception: Attention normal.         Mood and Affect: Mood normal.         Speech: Speech normal.         Behavior: Behavior normal. Behavior is cooperative.         Thought Content: Thought content normal.            Result Review :                   Assessment and Plan  Diagnoses and all orders for this visit:    1. Encounter for gynecological examination without abnormal finding (Primary)    2. Screening for cervical cancer  -     Pap IG, Rfx HPV ASCU; Future    3. Encounter for surveillance of contraceptive pills    Other orders  -     norethindrone (MICRONOR) 0.35 MG tablet; Take 1 tablet by mouth Daily.  Dispense: 28 tablet; Refill: 12      Patient Instructions   Self breast exam monthly  Annual screening mammogram   Regular exercise                This note was electronically signed.    Augusta Jaramillo PA-C   August 17, 2021

## 2021-08-27 ENCOUNTER — APPOINTMENT (OUTPATIENT)
Dept: MAMMOGRAPHY | Facility: HOSPITAL | Age: 46
End: 2021-08-27

## 2021-09-07 ENCOUNTER — TELEPHONE (OUTPATIENT)
Dept: OBSTETRICS AND GYNECOLOGY | Facility: CLINIC | Age: 46
End: 2021-09-07

## 2021-09-07 DIAGNOSIS — Z12.4 SCREENING FOR CERVICAL CANCER: ICD-10-CM

## 2021-09-07 NOTE — TELEPHONE ENCOUNTER
----- Message from Vee Valle sent at 9/7/2021 10:51 AM EDT -----  [facility] for 3 months at a time.    tyson

## 2021-09-07 NOTE — TELEPHONE ENCOUNTER
----- Message from Vee Valle sent at 9/7/2021 10:51 AM EDT -----  Patient needs her birth control [facility] to [facility] for 3 months at a time.    tyson

## 2021-09-17 ENCOUNTER — HOSPITAL ENCOUNTER (OUTPATIENT)
Dept: MAMMOGRAPHY | Facility: HOSPITAL | Age: 46
Discharge: HOME | End: 2021-09-17
Admitting: PHYSICIAN ASSISTANT

## 2021-09-17 DIAGNOSIS — Z12.31 SCREENING MAMMOGRAM, ENCOUNTER FOR: ICD-10-CM

## 2021-09-17 PROCEDURE — 77067 SCR MAMMO BI INCL CAD: CPT

## 2021-09-17 PROCEDURE — 77063 BREAST TOMOSYNTHESIS BI: CPT

## 2021-09-30 DIAGNOSIS — I10 ESSENTIAL HYPERTENSION: ICD-10-CM

## 2021-09-30 RX ORDER — LISINOPRIL 20 MG/1
20 TABLET ORAL DAILY
Qty: 90 TABLET | Refills: 1 | Status: SHIPPED | OUTPATIENT
Start: 2021-09-30 | End: 2022-04-19 | Stop reason: SDUPTHER

## 2022-02-02 ENCOUNTER — OFFICE VISIT (OUTPATIENT)
Dept: FAMILY MEDICINE CLINIC | Facility: CLINIC | Age: 47
End: 2022-02-02

## 2022-02-02 VITALS
HEIGHT: 65 IN | SYSTOLIC BLOOD PRESSURE: 128 MMHG | BODY MASS INDEX: 46.55 KG/M2 | WEIGHT: 279.4 LBS | OXYGEN SATURATION: 98 % | HEART RATE: 84 BPM | DIASTOLIC BLOOD PRESSURE: 80 MMHG | TEMPERATURE: 97.1 F

## 2022-02-02 DIAGNOSIS — Z13.6 ENCOUNTER FOR LIPID SCREENING FOR CARDIOVASCULAR DISEASE: ICD-10-CM

## 2022-02-02 DIAGNOSIS — Z13.220 ENCOUNTER FOR LIPID SCREENING FOR CARDIOVASCULAR DISEASE: ICD-10-CM

## 2022-02-02 DIAGNOSIS — I10 ESSENTIAL HYPERTENSION: Primary | ICD-10-CM

## 2022-02-02 PROCEDURE — 80053 COMPREHEN METABOLIC PANEL: CPT | Performed by: FAMILY MEDICINE

## 2022-02-02 PROCEDURE — 80061 LIPID PANEL: CPT | Performed by: FAMILY MEDICINE

## 2022-02-02 PROCEDURE — 99213 OFFICE O/P EST LOW 20 MIN: CPT | Performed by: FAMILY MEDICINE

## 2022-02-02 PROCEDURE — 85025 COMPLETE CBC W/AUTO DIFF WBC: CPT | Performed by: FAMILY MEDICINE

## 2022-02-02 PROCEDURE — 36415 COLL VENOUS BLD VENIPUNCTURE: CPT | Performed by: FAMILY MEDICINE

## 2022-02-02 NOTE — PROGRESS NOTES
"Nilay Xiao     VITALS: Blood pressure 128/80, pulse 84, temperature 97.1 °F (36.2 °C), height 165.1 cm (65\"), weight 127 kg (279 lb 6.4 oz), SpO2 98 %, not currently breastfeeding.    Subjective  Chief Complaint  Hypertension    Subjective          History of Present Illness:  Patient is a 46 y.o.  female with medical conditions significant for allergic rhinitis and hypertension who presents to clinic secondary to medical follow-up.    The patient states that she is doing well. She reports that she stopped taking her Singulair. The patient states that she went on vacation to [place]. She reports that she went to the beach and she got a rash down her leg. She states that she is not exercising or dieting. She is interested in weight loss surgery. The patient states that she consumes a lot of carbohydrates.    The patient states that she was diagnosed with COVID-19 in 12/2021. She reports that she lost her sense of smell and taste and experienced body aches. She states that she has regained her sense of taste and smell. She would like to get her COVID-19 booster injection.    No complaints about any of the medications.    The following portions of the patient's history were reviewed and updated as appropriate: allergies, current medications, past family history, past medical history, past social history, past surgical history and problem list.    Past Medical History  Past Medical History:   Diagnosis Date   • GERD (gastroesophageal reflux disease)    • History of Papanicolaou smear of cervix 06/2016   • Hypertension        Surgical History  Past Surgical History:   Procedure Laterality Date   • CYST REMOVAL      from jawbone   • ENDOSCOPY     • EYE SURGERY Bilateral     lasik   • TRUNK LESION/CYST EXCISION Right 3/17/2021    Procedure: EXCISION LESION SHOULDER;  Surgeon: Sidra Mackey MD;  Location: [facility];  Service: General;  Laterality: Right;   • WISDOM TOOTH EXTRACTION         Family History  Family History " "  Problem Relation Age of Onset   • Diabetes Father    • Kidney nephrosis Mother    • Cancer Maternal Aunt    • Cancer Paternal Uncle    • Breast cancer Neg Hx        Social History  Social History     Socioeconomic History   • Marital status:    Tobacco Use   • Smoking status: Never Smoker   • Smokeless tobacco: Never Used   Vaping Use   • Vaping Use: Never used   Substance and Sexual Activity   • Alcohol use: Yes     Comment: socially   • Drug use: No   • Sexual activity: Yes     Partners: Male     Birth control/protection: OCP       Objective   Vital Signs:   /80 (BP Location: Right arm, Patient Position: Sitting, Cuff Size: Adult)   Pulse 84   Temp 97.1 °F (36.2 °C)   Ht 165.1 cm (65\")   Wt 127 kg (279 lb 6.4 oz)   SpO2 98%   BMI 46.49 kg/m²     Physical Exam     Gen: Patient in NAD. Pleasant and answers appropriately. A&Ox3.    Skin: Warm and dry with normal turgor. No purpura, rashes, or unusual pigmentation noted. Hair is normal in appearance and distribution.    HEENT: NC/AT. No lesions noted. Conjunctiva clear, sclera nonicteric. PERRL. EOMI without nystagmus or strabismus. Fundi appear benign. No hemorrhages or exudates of eyes. Auditory canals are patent bilaterally without lesions. TMs intact,  nonerythematous, nonbulging without lesions. Nasal mucosa erythematous, and nonedematous. Frontal and maxillary sinuses are nontender. O/P erythematous and moist without exudate.    Neck: Supple without lymph nodes palpated. FROM.     Lungs: CTA B/L without rales, rhonchi, crackles, or wheezes.    Heart: RRR. S1 and S2 normal. No S3 or S4. No MRGT.    Abd: Soft, nontender,nondistended. (+)BSx4 quadrants.     Extrem: No CCE. Radial pulses 2+/4 and equal B/L. FROMx4. No bone, joint, or muscle tenderness noted.    Neuro: No focal motor/sensory deficits.    Procedures       Assessment and Plan    Nilay Xiao is a 46 y.o. here for medical followup.    Problem List Items Addressed This Visit     None "      Visit Diagnoses     Essential hypertension    -  Primary    Relevant Orders    Comprehensive Metabolic Panel    CBC Auto Differential    Encounter for lipid screening for cardiovascular disease        Relevant Orders    Lipid Panel        Patient's Body mass index is 46.49 kg/m². indicating that she is morbidly obese (BMI > 40 or > 35 with obesity - related health condition). Obesity-related health conditions include the following: hypertension. Obesity is unchanged. BMI is is above average; BMI management plan is completed. We discussed portion control and increasing exercise..           Follow Up   Return in about 6 months (around 8/2/2022), or LABS.  Findings and plans discussed with patient who verbalizes understanding and agreement. Will followup with patient once results are in. Patient was given instructions and counseling regarding her condition or for health maintenance advice. Please see specific information pulled into the AVS if appropriate.       Transcribed from ambient dictation for Stephenie Ewing MD by Kasandra Joshi.   02/02/22   14:59 EST    Patient verbalized consent to the visit recording.  I have personally performed the services described in this document as transcribed by the above individual, and it is both accurate and complete.  Stephenie Ewing MD  2/21/2022  07:27 EST

## 2022-02-03 LAB
ALBUMIN SERPL-MCNC: 4.1 G/DL (ref 3.5–5.2)
ALBUMIN/GLOB SERPL: 1.4 G/DL
ALP SERPL-CCNC: 74 U/L (ref 39–117)
ALT SERPL W P-5'-P-CCNC: 18 U/L (ref 1–33)
ANION GAP SERPL CALCULATED.3IONS-SCNC: 11.4 MMOL/L (ref 5–15)
AST SERPL-CCNC: 15 U/L (ref 1–32)
BASOPHILS # BLD AUTO: 0.08 10*3/MM3 (ref 0–0.2)
BASOPHILS NFR BLD AUTO: 1.3 % (ref 0–1.5)
BILIRUB SERPL-MCNC: 0.2 MG/DL (ref 0–1.2)
BUN SERPL-MCNC: 10 MG/DL (ref 6–20)
BUN/CREAT SERPL: 12.7 (ref 7–25)
CALCIUM SPEC-SCNC: 9.1 MG/DL (ref 8.6–10.5)
CHLORIDE SERPL-SCNC: 105 MMOL/L (ref 98–107)
CHOLEST SERPL-MCNC: 158 MG/DL (ref 0–200)
CO2 SERPL-SCNC: 24.6 MMOL/L (ref 22–29)
CREAT SERPL-MCNC: 0.79 MG/DL (ref 0.57–1)
DEPRECATED RDW RBC AUTO: 47.8 FL (ref 37–54)
EOSINOPHIL # BLD AUTO: 0.06 10*3/MM3 (ref 0–0.4)
EOSINOPHIL NFR BLD AUTO: 1 % (ref 0.3–6.2)
ERYTHROCYTE [DISTWIDTH] IN BLOOD BY AUTOMATED COUNT: 13.2 % (ref 12.3–15.4)
GFR SERPL CREATININE-BSD FRML MDRD: 79 ML/MIN/1.73
GLOBULIN UR ELPH-MCNC: 2.9 GM/DL
GLUCOSE SERPL-MCNC: 96 MG/DL (ref 65–99)
HCT VFR BLD AUTO: 42.4 % (ref 34–46.6)
HDLC SERPL-MCNC: 48 MG/DL (ref 40–60)
HGB BLD-MCNC: 13.4 G/DL (ref 12–15.9)
IMM GRANULOCYTES # BLD AUTO: 0.02 10*3/MM3 (ref 0–0.05)
IMM GRANULOCYTES NFR BLD AUTO: 0.3 % (ref 0–0.5)
LDLC SERPL CALC-MCNC: 97 MG/DL (ref 0–100)
LDLC/HDLC SERPL: 2.01 {RATIO}
LYMPHOCYTES # BLD AUTO: 1.63 10*3/MM3 (ref 0.7–3.1)
LYMPHOCYTES NFR BLD AUTO: 25.9 % (ref 19.6–45.3)
MCH RBC QN AUTO: 30.3 PG (ref 26.6–33)
MCHC RBC AUTO-ENTMCNC: 31.6 G/DL (ref 31.5–35.7)
MCV RBC AUTO: 95.9 FL (ref 79–97)
MONOCYTES # BLD AUTO: 0.44 10*3/MM3 (ref 0.1–0.9)
MONOCYTES NFR BLD AUTO: 7 % (ref 5–12)
NEUTROPHILS NFR BLD AUTO: 4.06 10*3/MM3 (ref 1.7–7)
NEUTROPHILS NFR BLD AUTO: 64.5 % (ref 42.7–76)
NRBC BLD AUTO-RTO: 0 /100 WBC (ref 0–0.2)
PLATELET # BLD AUTO: 305 10*3/MM3 (ref 140–450)
PMV BLD AUTO: 11.5 FL (ref 6–12)
POTASSIUM SERPL-SCNC: 4.1 MMOL/L (ref 3.5–5.2)
PROT SERPL-MCNC: 7 G/DL (ref 6–8.5)
RBC # BLD AUTO: 4.42 10*6/MM3 (ref 3.77–5.28)
SODIUM SERPL-SCNC: 141 MMOL/L (ref 136–145)
TRIGL SERPL-MCNC: 68 MG/DL (ref 0–150)
VLDLC SERPL-MCNC: 13 MG/DL (ref 5–40)
WBC NRBC COR # BLD: 6.29 10*3/MM3 (ref 3.4–10.8)

## 2022-02-21 ENCOUNTER — TELEPHONE (OUTPATIENT)
Dept: FAMILY MEDICINE CLINIC | Facility: CLINIC | Age: 47
End: 2022-02-21

## 2022-02-21 NOTE — TELEPHONE ENCOUNTER
----- Message from Stephenie Ewing MD sent at 2/20/2022 10:52 PM EST -----  Labs stable. Okay to either call or send letter to patient. Thanks.    Patient notified and expressed understanding.

## 2022-03-04 ENCOUNTER — OFFICE VISIT (OUTPATIENT)
Dept: FAMILY MEDICINE CLINIC | Facility: CLINIC | Age: 47
End: 2022-03-04

## 2022-03-04 VITALS
HEIGHT: 65 IN | OXYGEN SATURATION: 98 % | BODY MASS INDEX: 46.45 KG/M2 | HEART RATE: 106 BPM | DIASTOLIC BLOOD PRESSURE: 70 MMHG | SYSTOLIC BLOOD PRESSURE: 118 MMHG | TEMPERATURE: 96.9 F | WEIGHT: 278.8 LBS

## 2022-03-04 DIAGNOSIS — R59.0 CERVICAL ADENOPATHY: Primary | ICD-10-CM

## 2022-03-04 PROCEDURE — 99213 OFFICE O/P EST LOW 20 MIN: CPT | Performed by: NURSE PRACTITIONER

## 2022-03-04 NOTE — PROGRESS NOTES
Chief Complaint  Cyst (neck )    Bharath Xiao is a 46 y.o. female who presents today to [facility] for initial evaluation     HPI:   History of Present Illness    Presents to clinic for c/o knot on left lower neck. Noticed it this week. Last night noticed a little spot on right side of neck too. Denies any associated symptoms or history of same.     The following portions of the patient's history were reviewed and updated as appropriate: allergies, current medications, past family history, past medical history, past social history, past surgical history and problem list.    Objective     Problem List:  Patient Active Problem List   Diagnosis   • Lipoma of torso   • Morbidly obese (HCC)       Allergy:   Allergies   Allergen Reactions   • Ampicillin Rash        Discontinued Medications:  There are no discontinued medications.    Current Medications:   Current Outpatient Medications   Medication Sig Dispense Refill   • acetaminophen (TYLENOL) 500 MG tablet Take 500 mg by mouth Every 6 (Six) Hours As Needed for Mild Pain .     • ibuprofen (ADVIL,MOTRIN) 200 MG tablet Take 200 mg by mouth Every 8 (Eight) Hours As Needed for Mild Pain .     • lisinopril (PRINIVIL,ZESTRIL) 20 MG tablet Take 1 tablet by mouth Daily. 90 tablet 1   • norethindrone (MICRONOR) 0.35 MG tablet Take 1 tablet by mouth Daily. 28 tablet 12   • Pseudoephedrine HCl (SINUS & ALLERGY 12 HOUR PO) Take 1 capsule by mouth Daily.     • vitamin D (ERGOCALCIFEROL) 06661 units capsule capsule Take 1 capsule by mouth 1 (One) Time Per Week. (Patient taking differently: Take 50,000 Units by mouth Daily.) 8 capsule 0     No current facility-administered medications for this visit.       Past Medical History:  Past Medical History:   Diagnosis Date   • GERD (gastroesophageal reflux disease)    • History of Papanicolaou smear of cervix 06/2016   • Hypertension        Past Surgical History:  Past Surgical History:    Procedure Laterality Date   • CYST REMOVAL      from jawbone   • ENDOSCOPY     • EYE SURGERY Bilateral     lasik   • TRUNK LESION/CYST EXCISION Right 3/17/2021    Procedure: EXCISION LESION SHOULDER;  Surgeon: Sidra Mackey MD;  Location: [facility];  Service: General;  Laterality: Right;   • WISDOM TOOTH EXTRACTION         Social History:  Social History     Socioeconomic History   • Marital status:    Tobacco Use   • Smoking status: Never Smoker   • Smokeless tobacco: Never Used   Vaping Use   • Vaping Use: Never used   Substance and Sexual Activity   • Alcohol use: Yes     Comment: socially   • Drug use: No   • Sexual activity: Yes     Partners: Male     Birth control/protection: OCP       Family History:  Family History   Problem Relation Age of Onset   • Diabetes Father    • Kidney nephrosis Mother    • Cancer Maternal Aunt    • Cancer Paternal Uncle    • Breast cancer Neg Hx        Review of Systems:  Review of Systems   Constitutional: Negative for chills and fever.   HENT: Negative for congestion, ear pain, rhinorrhea and sore throat.    Respiratory: Negative for cough.    Cardiovascular: Negative for chest pain.   Gastrointestinal: Negative for nausea and vomiting.       Physical Exam:  Physical Exam  Vitals and nursing note reviewed.   Constitutional:       General: She is not in acute distress.     Appearance: Normal appearance. She is obese. She is not ill-appearing or toxic-appearing.   HENT:      Head: Normocephalic.      Right Ear: Tympanic membrane, ear canal and external ear normal.      Left Ear: Tympanic membrane, ear canal and external ear normal.      Nose: Nose normal.      Mouth/Throat:      Mouth: Mucous membranes are moist.      Pharynx: Oropharynx is clear.   Eyes:      Conjunctiva/sclera: Conjunctivae normal.   Cardiovascular:      Rate and Rhythm: Normal rate and regular rhythm.      Heart sounds: Normal heart sounds.   Pulmonary:      Effort: Pulmonary effort is normal. No  "respiratory distress.      Breath sounds: Normal breath sounds.   Abdominal:      General: Bowel sounds are normal.      Palpations: Abdomen is soft.   Musculoskeletal:         General: Normal range of motion.      Cervical back: Normal range of motion and neck supple. No rigidity.   Lymphadenopathy:      Cervical: Cervical adenopathy present.      Right cervical: Superficial cervical adenopathy present.      Left cervical: Deep cervical adenopathy present.   Skin:     General: Skin is warm and dry.      Coloration: Skin is not pale.   Neurological:      Mental Status: She is alert and oriented to person, place, and time.   Psychiatric:         Mood and Affect: Mood normal.         Behavior: Behavior normal.         Thought Content: Thought content normal.         Judgment: Judgment normal.         Vital Signs:   /70 (BP Location: Right arm, Patient Position: Sitting, Cuff Size: Large Adult)   Pulse 106   Temp 96.9 °F (36.1 °C) (Temporal)   Ht 165.1 cm (65\")   Wt 126 kg (278 lb 12.8 oz)   SpO2 98%   BMI 46.39 kg/m²              Assessment and Plan   Diagnoses and all orders for this visit:    1. Cervical adenopathy (Primary)    Monitor symptoms and follow-up for any new or worsening symptoms or any other concerns.  Follow-up for recheck if no resolution of adenopathy after 3-4 weeks.    Discussed possible differential diagnoses, testing, treatment, recommended non-pharmacological interventions, risks, warning signs to monitor for that would indicate need for follow-up in clinic or ER. If no improvement with these regimens or you have new or worsening symptoms follow-up. Patient verbalizes understanding and agreement with plan of care. Denies further needs or concerns.     I spent 20 minutes caring for patient on this date of service. This time includes time spent by me in the following activities: preparing for the visit, reviewing tests, obtaining and/or reviewing a separately obtained history, " performing a medically appropriate examination and/or evaluation, counseling and educating the patient/family/caregiver, ordering medications, tests, or procedures and documenting information in the medical record    Meds ordered during this visit:  No orders of the defined types were placed in this encounter.      Patient Instructions:  Patient instructions given for the following visit diagnosis:    ICD-10-CM ICD-9-CM   1. Cervical adenopathy  R59.0 785.6       Follow Up   Return if symptoms worsen or fail to improve.        This document has been electronically signed by SUDARSHAN Arechiga  March 4, 2022 17:39 EST    Patient was given instructions and counseling regarding her condition or for health maintenance advice. Please see specific information pulled into the AVS if appropriate.     Part of this note may be an electronic transcription/translation of spoken language to printed text using the Dragon Dictation System.

## 2022-04-19 DIAGNOSIS — I10 ESSENTIAL HYPERTENSION: ICD-10-CM

## 2022-04-20 RX ORDER — LISINOPRIL 20 MG/1
20 TABLET ORAL DAILY
Qty: 90 TABLET | Refills: 1 | Status: SHIPPED | OUTPATIENT
Start: 2022-04-20 | End: 2022-08-02 | Stop reason: SDUPTHER

## 2022-08-02 ENCOUNTER — OFFICE VISIT (OUTPATIENT)
Dept: FAMILY MEDICINE CLINIC | Facility: CLINIC | Age: 47
End: 2022-08-02

## 2022-08-02 VITALS
HEART RATE: 97 BPM | SYSTOLIC BLOOD PRESSURE: 110 MMHG | BODY MASS INDEX: 47.82 KG/M2 | HEIGHT: 65 IN | WEIGHT: 287 LBS | TEMPERATURE: 97.8 F | DIASTOLIC BLOOD PRESSURE: 78 MMHG | OXYGEN SATURATION: 94 %

## 2022-08-02 DIAGNOSIS — E55.9 VITAMIN D DEFICIENCY: ICD-10-CM

## 2022-08-02 DIAGNOSIS — I10 ESSENTIAL HYPERTENSION: ICD-10-CM

## 2022-08-02 DIAGNOSIS — J30.89 SEASONAL ALLERGIC RHINITIS DUE TO OTHER ALLERGIC TRIGGER: Primary | ICD-10-CM

## 2022-08-02 PROCEDURE — 99214 OFFICE O/P EST MOD 30 MIN: CPT | Performed by: FAMILY MEDICINE

## 2022-08-02 RX ORDER — LISINOPRIL 20 MG/1
20 TABLET ORAL DAILY
Qty: 90 TABLET | Refills: 1 | Status: SHIPPED | OUTPATIENT
Start: 2022-08-02 | End: 2023-02-02 | Stop reason: SDUPTHER

## 2022-08-02 RX ORDER — MONTELUKAST SODIUM 10 MG/1
10 TABLET ORAL NIGHTLY
Qty: 90 TABLET | Refills: 1 | Status: SHIPPED | OUTPATIENT
Start: 2022-08-02 | End: 2023-02-02 | Stop reason: SDUPTHER

## 2022-08-02 NOTE — PROGRESS NOTES
"Nilay Xiao     VITALS: Blood pressure 110/78, pulse 97, temperature 97.8 °F (36.6 °C), height 165.1 cm (65\"), weight 130 kg (287 lb), SpO2 94 %, not currently breastfeeding.    Subjective  Chief Complaint  Essential hypertension    Subjective          History of Present Illness:  Patient is a 47 y.o.  female with medical conditions significant for hypertension and allergic rhinitis who presents to clinic secondary to medical follow-up.    The patient states that she is doing well overall. She states that she is taking Singulair at night for her allergic rhinitis, but affirms no improvements in the morning. She states that last week she was experiencing chest pain in the morning, associated with ear pain and drainage that lasted for 2 days. She states occasional edema and dizziness that is minor in severity.     The patient's glucose levels is within normal limits of 96 mg/dL, she is not pre-diabetic.     The patient's blood pressure appears stable today.     The patient denies scheduling for a colonoscopy.    The patient confirms annual mammograms at [facility]. She has her next mammogram scheduled for 09/17/2022.     No complaints about any of the medications.    The following portions of the patient's history were reviewed and updated as appropriate: allergies, current medications, past family history, past medical history, past social history, past surgical history and problem list.    Past Medical History  Past Medical History:   Diagnosis Date   • GERD (gastroesophageal reflux disease)    • History of Papanicolaou smear of cervix 06/2016   • Hypertension        Surgical History  Past Surgical History:   Procedure Laterality Date   • CYST REMOVAL      from jawbone   • ENDOSCOPY     • EYE SURGERY Bilateral     lasik   • TRUNK LESION/CYST EXCISION Right 3/17/2021    Procedure: EXCISION LESION SHOULDER;  Surgeon: Sidra Mackey MD;  Location: [facility];  Service: General;  Laterality: Right;   • WISDOM " "TOOTH EXTRACTION         Family History  Family History   Problem Relation Age of Onset   • Diabetes Father    • Kidney nephrosis Mother    • Cancer Maternal Aunt    • Cancer Paternal Uncle    • Breast cancer Neg Hx        Social History  Social History     Socioeconomic History   • Marital status:    Tobacco Use   • Smoking status: Never Smoker   • Smokeless tobacco: Never Used   Vaping Use   • Vaping Use: Never used   Substance and Sexual Activity   • Alcohol use: Yes     Comment: socially   • Drug use: No   • Sexual activity: Yes     Partners: Male     Birth control/protection: OCP       Objective   Vital Signs:   /78 (BP Location: Right arm, Patient Position: Sitting, Cuff Size: Adult)   Pulse 97   Temp 97.8 °F (36.6 °C)   Ht 165.1 cm (65\")   Wt 130 kg (287 lb)   SpO2 94%   BMI 47.76 kg/m²     Physical Exam     Gen: Patient in NAD. Pleasant and answers appropriately. A&Ox3.    Skin: Warm and dry with normal turgor. No purpura, rashes, or unusual pigmentation noted. Hair is normal in appearance and distribution.    HEENT: NC/AT. No lesions noted. Conjunctiva clear, sclera nonicteric. PERRL. EOMI without nystagmus or strabismus. Fundi appear benign. No hemorrhages or exudates of eyes. Auditory canals are patent bilaterally without lesions. TMs intact,  nonerythematous, nonbulging without lesions. Nasal mucosa erythematous, and nonedematous. Frontal and maxillary sinuses are nontender. O/P erythematous and moist without exudate.    Neck: Supple without lymph nodes palpated. FROM.     Lungs: CTA B/L without rales, rhonchi, crackles, or wheezes.    Heart: RRR. S1 and S2 normal. No S3 or S4. No MRGT.    Abd: Soft, nontender,nondistended. (+)BSx4 quadrants.     Extrem: No CCE. Radial pulses 2+/4 and equal B/L. FROMx4. No bone, joint, or muscle tenderness noted.    Neuro: No focal motor/sensory deficits.    Procedures       Assessment and Plan    Nilay Xiao is a 47 y.o. here for medical " followup.    Problem List Items Addressed This Visit    None     Visit Diagnoses     Seasonal allergic rhinitis due to other allergic trigger    -  Primary    Relevant Medications    montelukast (Singulair) 10 MG tablet    I advised the patient to use Flonase before going to bed at night, along with a nasal saline rinse with or without Flonase. I also recommend the patient to take Singulair in the mornings rather than at night-time. The patient should call me if symptoms improve or worsen.    Vitamin D deficiency      Patient currently on high dose course of vitamin D.    Essential hypertension        Relevant Medications    lisinopril (PRINIVIL,ZESTRIL) 20 MG tablet    The patient's blood pressure is well controlled at this time. The patient's blood work is up-to-date and appears within normal limits, she does not need to follow with routine blood work today.             Class 3 Severe Obesity (BMI >=40). Obesity-related health conditions include the following: hypertension. Obesity is unchanged. BMI is is above average; BMI management plan is completed. We discussed portion control and increasing exercise.           Follow Up   Return in about 6 months (around 2/2/2023).  Findings and plans discussed with patient who verbalizes understanding and agreement. Will followup with patient once results are in. Patient was given instructions and counseling regarding her condition or for health maintenance advice. Please see specific information pulled into the AVS if appropriate.       Transcribed from ambient dictation for Stephenie Ewing MD by Aurelia Winters.  08/02/22   14:06 EDT    Patient verbalized consent to the visit recording.  I have personally performed the services described in this document as transcribed by the above individual, and it is both accurate and complete.  Stephenie Ewing MD  8/22/2022  04:02 EDT

## 2022-09-16 ENCOUNTER — TRANSCRIBE ORDERS (OUTPATIENT)
Dept: ADMINISTRATIVE | Facility: HOSPITAL | Age: 47
End: 2022-09-16

## 2022-09-16 DIAGNOSIS — Z12.31 VISIT FOR SCREENING MAMMOGRAM: Primary | ICD-10-CM

## 2022-09-19 ENCOUNTER — OFFICE VISIT (OUTPATIENT)
Dept: OBSTETRICS AND GYNECOLOGY | Facility: CLINIC | Age: 47
End: 2022-09-19

## 2022-09-19 VITALS
DIASTOLIC BLOOD PRESSURE: 82 MMHG | SYSTOLIC BLOOD PRESSURE: 128 MMHG | HEIGHT: 65 IN | BODY MASS INDEX: 48.15 KG/M2 | WEIGHT: 289 LBS

## 2022-09-19 DIAGNOSIS — Z12.4 SCREENING FOR CERVICAL CANCER: ICD-10-CM

## 2022-09-19 DIAGNOSIS — Z01.419 WELL WOMAN EXAM WITH ROUTINE GYNECOLOGICAL EXAM: Primary | ICD-10-CM

## 2022-09-19 DIAGNOSIS — Z30.41 ENCOUNTER FOR SURVEILLANCE OF CONTRACEPTIVE PILLS: ICD-10-CM

## 2022-09-19 PROCEDURE — 99396 PREV VISIT EST AGE 40-64: CPT | Performed by: PHYSICIAN ASSISTANT

## 2022-09-19 RX ORDER — ACETAMINOPHEN AND CODEINE PHOSPHATE 120; 12 MG/5ML; MG/5ML
1 SOLUTION ORAL DAILY
Qty: 84 TABLET | Refills: 3 | Status: SHIPPED | OUTPATIENT
Start: 2022-09-19 | End: 2023-09-19

## 2022-09-19 NOTE — PROGRESS NOTES
Subjective   Chief Complaint   Patient presents with   • Gynecologic Exam     Last pap done 21-WNL, MMG is scheduled, would like to discuss other birth control options       Nilay Xiao is a 47 y.o. year old  presenting to be seen for her annual gynecological exam.   Currently using Micronor ocp due to age and hypertension. Occasionally has irregular bleeding with Micronor.   Wants to continue Micronor for now but may consider tubal ligation  Has screening mammogram schedule [facility] in October      Past Medical History:   Diagnosis Date   • GERD (gastroesophageal reflux disease)    • History of Papanicolaou smear of cervix 2016   • Hypertension    • Migraine    • Ovarian cyst In my 20s   • PMS (premenstrual syndrome)    • Varicella Early 80s        Current Outpatient Medications:   •  acetaminophen (TYLENOL) 500 MG tablet, Take 500 mg by mouth Every 6 (Six) Hours As Needed for Mild Pain ., Disp: , Rfl:   •  ibuprofen (ADVIL,MOTRIN) 200 MG tablet, Take 200 mg by mouth Every 8 (Eight) Hours As Needed for Mild Pain ., Disp: , Rfl:   •  lisinopril (PRINIVIL,ZESTRIL) 20 MG tablet, Take 1 tablet by mouth Daily., Disp: 90 tablet, Rfl: 1  •  montelukast (Singulair) 10 MG tablet, Take 1 tablet by mouth Every Night., Disp: 90 tablet, Rfl: 1  •  Pseudoephedrine HCl (SINUS & ALLERGY 12 HOUR PO), Take 1 capsule by mouth Daily., Disp: , Rfl:   •  norethindrone (MICRONOR) 0.35 MG tablet, Take 1 tablet by mouth Daily., Disp: 84 tablet, Rfl: 3   Allergies   Allergen Reactions   • Ampicillin Rash      Past Surgical History:   Procedure Laterality Date   • CYST REMOVAL      from jawbone   • ENDOSCOPY     • EYE SURGERY Bilateral     lasik   • TRUNK LESION/CYST EXCISION Right 3/17/2021    Procedure: EXCISION LESION SHOULDER;  Surgeon: Sidra Mackey MD;  Location: [facility];  Service: General;  Laterality: Right;   • WISDOM TOOTH EXTRACTION        Social History     Socioeconomic History   • Marital status:   "  Tobacco Use   • Smoking status: Never Smoker   • Smokeless tobacco: Never Used   Vaping Use   • Vaping Use: Never used   Substance and Sexual Activity   • Alcohol use: Yes     Comment: socially   • Drug use: No   • Sexual activity: Yes     Partners: Male     Birth control/protection: Condom, OCP, Birth control pill     Comment: Only with my       Family History   Problem Relation Age of Onset   • Diabetes Father         Started when he was older   • Kidney nephrosis Mother    • Cancer Maternal Aunt    • Cancer Paternal Uncle    • Breast cancer Neg Hx        Review of Systems   Constitutional: Negative for chills, diaphoresis and fever.   Gastrointestinal: Negative.    Genitourinary: Negative for difficulty urinating, dysuria and pelvic pain.           Objective   /82   Ht 165.1 cm (65\")   Wt 131 kg (289 lb)   LMP 09/12/2022 (Exact Date)   Breastfeeding No   BMI 48.09 kg/m²     Physical Exam  Constitutional:       Appearance: Normal appearance. She is well-developed and well-groomed. She is morbidly obese.   Eyes:      General: Lids are normal.      Extraocular Movements: Extraocular movements intact.      Conjunctiva/sclera: Conjunctivae normal.   Neck:      Thyroid: No thyroid mass.   Chest:   Breasts: Breasts are symmetrical.      Right: No inverted nipple, mass, nipple discharge, skin change, tenderness or axillary adenopathy.      Left: No inverted nipple, mass, nipple discharge, skin change, tenderness or axillary adenopathy.       Abdominal:      General: There is no distension.      Palpations: Abdomen is soft. There is no hepatomegaly or splenomegaly.      Tenderness: There is no abdominal tenderness.   Genitourinary:     Exam position: Lithotomy position.      Labia:         Right: No rash, tenderness or lesion.         Left: No rash, tenderness or lesion.       Urethra: No prolapse, urethral pain, urethral swelling or urethral lesion.      Vagina: No vaginal discharge, tenderness or " lesions.      Cervix: No cervical motion tenderness, discharge, friability or lesion.      Uterus: Not enlarged and not tender.       Adnexa:         Right: No mass or tenderness.          Left: No mass or tenderness.     Musculoskeletal:      Cervical back: Neck supple.   Lymphadenopathy:      Upper Body:      Right upper body: No axillary adenopathy.      Left upper body: No axillary adenopathy.   Skin:     General: Skin is warm and dry.      Findings: No lesion.   Neurological:      General: No focal deficit present.      Mental Status: She is alert and oriented to person, place, and time.   Psychiatric:         Attention and Perception: Attention normal.         Mood and Affect: Mood normal.         Speech: Speech normal.         Behavior: Behavior normal.         Thought Content: Thought content normal.            Result Review :                   Assessment and Plan  Diagnoses and all orders for this visit:    1. Well woman exam with routine gynecological exam (Primary)    2. Screening for cervical cancer  -     LIQUID-BASED PAP SMEAR, [facility] ([facility])    3. Encounter for surveillance of contraceptive pills    Other orders  -     norethindrone (MICRONOR) 0.35 MG tablet; Take 1 tablet by mouth Daily.  Dispense: 84 tablet; Refill: 3      Patient Instructions   Self breast exam monthly  Annual screening mammogram starting age 40  Regular exercise    Counseled regarding tubal ligation and brochure given. She may call back to schedule if decides to do             This note was electronically signed.    Augusta Jaramillo PA-C   September 19, 2022

## 2022-09-19 NOTE — PATIENT INSTRUCTIONS
Self breast exam monthly  Annual screening mammogram starting age 40  Regular exercise    Counseled regarding tubal ligation and brochure given. She may call back to schedule if decides to do

## 2022-09-21 LAB — REF LAB TEST METHOD: NORMAL

## 2022-10-12 ENCOUNTER — TELEPHONE (OUTPATIENT)
Dept: FAMILY MEDICINE CLINIC | Facility: CLINIC | Age: 47
End: 2022-10-12

## 2022-10-12 NOTE — TELEPHONE ENCOUNTER
Please make her an appointment for her to come see me so we can evaluate this. May be nothing; may be something, but it's hard to tell over the phone without a physical exam.

## 2022-10-12 NOTE — TELEPHONE ENCOUNTER
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

## 2022-10-12 NOTE — TELEPHONE ENCOUNTER
Please make her an appointment for her to come see me so we can evaluate this. May be nothing; may be something, but it's hard to tell over the phone without a physical exam.      Spoke with patient & scheduled for 10/31/2022,instructed if worsens before this appointment to call back & we will work her in.

## 2022-10-14 ENCOUNTER — HOSPITAL ENCOUNTER (OUTPATIENT)
Dept: MAMMOGRAPHY | Facility: HOSPITAL | Age: 47
Discharge: HOME | End: 2022-10-14
Admitting: PHYSICIAN ASSISTANT

## 2022-10-14 DIAGNOSIS — Z12.31 VISIT FOR SCREENING MAMMOGRAM: ICD-10-CM

## 2022-10-14 PROCEDURE — 77063 BREAST TOMOSYNTHESIS BI: CPT

## 2022-10-14 PROCEDURE — 77067 SCR MAMMO BI INCL CAD: CPT

## 2022-10-31 ENCOUNTER — OFFICE VISIT (OUTPATIENT)
Dept: FAMILY MEDICINE CLINIC | Facility: CLINIC | Age: 47
End: 2022-10-31

## 2022-10-31 VITALS
HEART RATE: 90 BPM | BODY MASS INDEX: 48.42 KG/M2 | SYSTOLIC BLOOD PRESSURE: 116 MMHG | DIASTOLIC BLOOD PRESSURE: 82 MMHG | WEIGHT: 290.6 LBS | HEIGHT: 65 IN | TEMPERATURE: 96.9 F | OXYGEN SATURATION: 98 %

## 2022-10-31 DIAGNOSIS — I10 ESSENTIAL HYPERTENSION: ICD-10-CM

## 2022-10-31 DIAGNOSIS — G89.29 CHRONIC PAIN OF BOTH KNEES: ICD-10-CM

## 2022-10-31 DIAGNOSIS — R60.0 LEG EDEMA: Primary | ICD-10-CM

## 2022-10-31 DIAGNOSIS — M25.561 CHRONIC PAIN OF BOTH KNEES: ICD-10-CM

## 2022-10-31 DIAGNOSIS — M25.562 CHRONIC PAIN OF BOTH KNEES: ICD-10-CM

## 2022-10-31 PROCEDURE — 99214 OFFICE O/P EST MOD 30 MIN: CPT | Performed by: FAMILY MEDICINE

## 2022-12-19 ENCOUNTER — TELEPHONE (OUTPATIENT)
Dept: FAMILY MEDICINE CLINIC | Facility: CLINIC | Age: 47
End: 2022-12-19

## 2022-12-19 NOTE — TELEPHONE ENCOUNTER
Caller: Nilay Xiao    Relationship to patient: Self    Best call back number: [phone]    FYI: PATIENT STATED THAT SHE WAS SENDING PAPERWORK THROUGH [facility] TO PROVIDER BECAUSE SHE HAS BEEN HAVING ISSUES WITH FAX NUMBER NOT WORKING AND WILL CALL BACK TO SEE IF THE MESSAGE HAS GONE THROUGH

## 2022-12-20 NOTE — TELEPHONE ENCOUNTER
I can't see it? It might be easier if she dropped them off.    Spoke with patient & she verbalized understanding.

## 2023-02-02 ENCOUNTER — OFFICE VISIT (OUTPATIENT)
Dept: FAMILY MEDICINE CLINIC | Facility: CLINIC | Age: 48
End: 2023-02-02
Payer: COMMERCIAL

## 2023-02-02 VITALS
HEIGHT: 65 IN | WEIGHT: 293 LBS | TEMPERATURE: 97.5 F | OXYGEN SATURATION: 98 % | SYSTOLIC BLOOD PRESSURE: 128 MMHG | HEART RATE: 84 BPM | RESPIRATION RATE: 20 BRPM | DIASTOLIC BLOOD PRESSURE: 80 MMHG | BODY MASS INDEX: 48.82 KG/M2

## 2023-02-02 DIAGNOSIS — J30.89 SEASONAL ALLERGIC RHINITIS DUE TO OTHER ALLERGIC TRIGGER: ICD-10-CM

## 2023-02-02 DIAGNOSIS — I10 ESSENTIAL HYPERTENSION: ICD-10-CM

## 2023-02-02 DIAGNOSIS — F41.9 ANXIETY: Primary | ICD-10-CM

## 2023-02-02 DIAGNOSIS — Z13.220 ENCOUNTER FOR LIPID SCREENING FOR CARDIOVASCULAR DISEASE: ICD-10-CM

## 2023-02-02 DIAGNOSIS — Z13.6 ENCOUNTER FOR LIPID SCREENING FOR CARDIOVASCULAR DISEASE: ICD-10-CM

## 2023-02-02 PROCEDURE — 84443 ASSAY THYROID STIM HORMONE: CPT | Performed by: FAMILY MEDICINE

## 2023-02-02 PROCEDURE — 36415 COLL VENOUS BLD VENIPUNCTURE: CPT | Performed by: FAMILY MEDICINE

## 2023-02-02 PROCEDURE — 80061 LIPID PANEL: CPT | Performed by: FAMILY MEDICINE

## 2023-02-02 PROCEDURE — 99214 OFFICE O/P EST MOD 30 MIN: CPT | Performed by: FAMILY MEDICINE

## 2023-02-02 PROCEDURE — 80053 COMPREHEN METABOLIC PANEL: CPT | Performed by: FAMILY MEDICINE

## 2023-02-02 RX ORDER — LISINOPRIL 20 MG/1
20 TABLET ORAL DAILY
Qty: 90 TABLET | Refills: 1 | Status: SHIPPED | OUTPATIENT
Start: 2023-02-02

## 2023-02-02 RX ORDER — BUSPIRONE HYDROCHLORIDE 5 MG/1
5 TABLET ORAL 3 TIMES DAILY PRN
Qty: 90 TABLET | Refills: 1 | Status: SHIPPED | OUTPATIENT
Start: 2023-02-02

## 2023-02-02 RX ORDER — MONTELUKAST SODIUM 10 MG/1
10 TABLET ORAL NIGHTLY
Qty: 90 TABLET | Refills: 1 | Status: SHIPPED | OUTPATIENT
Start: 2023-02-02

## 2023-02-02 NOTE — PROGRESS NOTES
"Nilay Xiao     VITALS: Blood pressure 128/80, pulse 84, temperature 97.5 °F (36.4 °C), resp. rate 20, height 165.1 cm (65\"), weight 136 kg (298 lb 12.8 oz), SpO2 98 %, not currently breastfeeding.    Subjective  Chief Complaint  Hypertension    Subjective          History of Present Illness:  Patient is a 47 y.o. female with a medical history significant for hypertension and allergic rhinitis who presents to clinic secondary to medical follow-up.    The patient states that she is doing well. She reports that she has cried the past 2 weeks due to anxiety from worrying about the health of her . She takes over-the-counter sinus medication and expresses concern about the acetaminophen affecting her liver, but she does not take the medication daily.     The patient experiences headaches daily when she goes to work. Her coworker sets the heater to 85 degrees Fahrenheit, which causes the patient to feel hot, flushed, and experience headaches. She believes her blood pressure is elevated when this occurs because she experienced these symptoms prior to starting blood pressure medication, and she is not symptomatic when her coworker is absent. She denies checking her blood pressure, but does take medication.     No complaints about any of the medications.    The following portions of the patient's history were reviewed and updated as appropriate: allergies, current medications, past family history, past medical history, past social history, past surgical history and problem list.    Past Medical History  Past Medical History:   Diagnosis Date   • GERD (gastroesophageal reflux disease)    • History of Papanicolaou smear of cervix 06/2016   • Hypertension    • Migraine    • Ovarian cyst In my 20s   • PMS (premenstrual syndrome)    • Varicella Early 80s       Surgical History  Past Surgical History:   Procedure Laterality Date   • CYST REMOVAL      from jawbone   • ENDOSCOPY     • EYE SURGERY Bilateral     lasik   • TRUNK " "LESION/CYST EXCISION Right 3/17/2021    Procedure: EXCISION LESION SHOULDER;  Surgeon: Sidra Mackey MD;  Location:  [facility];  Service: General;  Laterality: Right;   • WISDOM TOOTH EXTRACTION         Family History  Family History   Problem Relation Age of Onset   • Diabetes Father         Started when he was older   • Kidney nephrosis Mother    • Cancer Maternal Aunt    • Cancer Paternal Uncle    • Breast cancer Neg Hx        Social History  Social History     Socioeconomic History   • Marital status:    Tobacco Use   • Smoking status: Never   • Smokeless tobacco: Never   Vaping Use   • Vaping Use: Never used   Substance and Sexual Activity   • Alcohol use: Yes     Comment: socially   • Drug use: No   • Sexual activity: Yes     Partners: Male     Birth control/protection: Condom, OCP, Birth control pill     Comment: Only with my        Objective   Vital Signs:   /80 (BP Location: Right arm, Patient Position: Sitting)   Pulse 84   Temp 97.5 °F (36.4 °C)   Resp 20   Ht 165.1 cm (65\")   Wt 136 kg (298 lb 12.8 oz)   SpO2 98%   BMI 49.72 kg/m²     Physical Exam     Gen: Patient in NAD. Pleasant and answers appropriately. A&Ox3.    Skin: Warm and dry with normal turgor. No purpura, rashes, or unusual pigmentation noted. Hair is normal in appearance and distribution.    HEENT: NC/AT. No lesions noted. Conjunctiva clear, sclera nonicteric. PERRL. EOMI without nystagmus or strabismus. Fundi appear benign. No hemorrhages or exudates of eyes. Auditory canals are patent bilaterally without lesions. TMs intact,  nonerythematous, bulging without lesions. Nasal mucosa erythematous, and nonedematous. Frontal and maxillary sinuses are nontender. O/P erythematous and moist without exudate.    Neck: Supple without lymph nodes palpated. FROM.     Lungs: Slightly decreased B/L without rales, rhonchi, crackles, or wheezes.    Heart: RRR. S1 and S2 normal. No S3 or S4. No MRGT.    Abd: Soft, " nontender,nondistended. (+)BSx4 quadrants.     Extrem: No CCE. Radial pulses 2+/4 and equal B/L. FROMx4. No bone, joint, or muscle tenderness noted.    Neuro: No focal motor/sensory deficits.    Procedures    Result Review :   The following data was reviewed by: Stephenie Ewing MD on 02/02/2023:            Assessment and Plan      1. Anxiety.  - BuSpar will be given to the patient to be taken as needed, approximately 3 times daily.    2. Hypertension.  - She will continue taking medication as prescribed. She was advised to check her blood pressure at work.   - Blood work will be obtained. Her liver function, kidney function, electrolytes, cholesterol, and thyroid levels will be checked.       Problem List Items Addressed This Visit    None  Visit Diagnoses     Anxiety    -  Primary    Relevant Medications    busPIRone (BUSPAR) 5 MG tablet    Other Relevant Orders    Comprehensive Metabolic Panel    Essential hypertension        Relevant Medications    lisinopril (PRINIVIL,ZESTRIL) 20 MG tablet    Other Relevant Orders    Comprehensive Metabolic Panel    TSH    Encounter for lipid screening for cardiovascular disease        Relevant Orders    Lipid Panel    Seasonal allergic rhinitis due to other allergic trigger        Relevant Medications    montelukast (Singulair) 10 MG tablet          Class 3 Severe Obesity (BMI >=40). Obesity-related health conditions include the following: hypertension. Obesity is unchanged. BMI is is above average; BMI management plan is completed. We discussed portion control and increasing exercise.                 Follow Up   Return in about 3 months (around 5/2/2023), or LABS.  Findings and plans discussed with patient who verbalizes understanding and agreement. Will followup with patient once results are in. Patient was given instructions and counseling regarding her condition or for health maintenance advice. Please see specific information pulled into the AVS if appropriate.      Stephenie Ewing MD    Transcribed from ambient dictation for Stephenie Ewing MD by Jeannie Kimball.  02/02/23   09:40 EST    Patient or patient representative verbalized consent to the visit recording.  I have personally performed the services described in this document as transcribed by the above individual, and it is both accurate and complete.

## 2023-02-03 LAB
ALBUMIN SERPL-MCNC: 4.2 G/DL (ref 3.5–5.2)
ALBUMIN/GLOB SERPL: 1.9 G/DL
ALP SERPL-CCNC: 72 U/L (ref 39–117)
ALT SERPL W P-5'-P-CCNC: 17 U/L (ref 1–33)
ANION GAP SERPL CALCULATED.3IONS-SCNC: 8 MMOL/L (ref 5–15)
AST SERPL-CCNC: 15 U/L (ref 1–32)
BILIRUB SERPL-MCNC: 0.2 MG/DL (ref 0–1.2)
BUN SERPL-MCNC: 12 MG/DL (ref 6–20)
BUN/CREAT SERPL: 15.4 (ref 7–25)
CALCIUM SPEC-SCNC: 9.3 MG/DL (ref 8.6–10.5)
CHLORIDE SERPL-SCNC: 107 MMOL/L (ref 98–107)
CHOLEST SERPL-MCNC: 187 MG/DL (ref 0–200)
CO2 SERPL-SCNC: 25 MMOL/L (ref 22–29)
CREAT SERPL-MCNC: 0.78 MG/DL (ref 0.57–1)
EGFRCR SERPLBLD CKD-EPI 2021: 95.6 ML/MIN/1.73
GLOBULIN UR ELPH-MCNC: 2.2 GM/DL
GLUCOSE SERPL-MCNC: 97 MG/DL (ref 65–99)
HDLC SERPL-MCNC: 53 MG/DL (ref 40–60)
LDLC SERPL CALC-MCNC: 120 MG/DL (ref 0–100)
LDLC/HDLC SERPL: 2.24 {RATIO}
POTASSIUM SERPL-SCNC: 4.1 MMOL/L (ref 3.5–5.2)
PROT SERPL-MCNC: 6.4 G/DL (ref 6–8.5)
SODIUM SERPL-SCNC: 140 MMOL/L (ref 136–145)
TRIGL SERPL-MCNC: 76 MG/DL (ref 0–150)
TSH SERPL DL<=0.05 MIU/L-ACNC: 3.99 UIU/ML (ref 0.27–4.2)
VLDLC SERPL-MCNC: 14 MG/DL (ref 5–40)

## 2023-02-20 ENCOUNTER — TELEPHONE (OUTPATIENT)
Dept: FAMILY MEDICINE CLINIC | Facility: CLINIC | Age: 48
End: 2023-02-20
Payer: COMMERCIAL

## 2023-02-20 NOTE — TELEPHONE ENCOUNTER
----- Message from Stephenie Ewing MD sent at 2/19/2023 11:22 PM EST -----  Please call the patient regarding her abnormal result. Please let her know that her cholesterol has jumped about 20 points. Still okay and borderline. No need for medications, but let her know to choose healthier fats. If she would like handouts on the Mediterranean Diet which would decrease her cholesterol and also help her lose weight, we can send them to her.     The 10-year ASCVD risk score (Alpa DERAS, et al., 2019) is: 1%    Values used to calculate the score:      Age: 47 years      Sex: Female      Is Non- : No      Diabetic: No      Tobacco smoker: No      Systolic Blood Pressure: 128 mmHg      Is BP treated: Yes      HDL Cholesterol: 53 mg/dL      Total Cholesterol: 187 mg/dL    Spoke with patient & she verbalized understanding,requested Diet sheets be mailed to her,mailed as requested.

## 2023-06-12 RX ORDER — NORETHINDRONE 0.35 MG/1
TABLET ORAL
Qty: 84 TABLET | Refills: 0 | Status: SHIPPED | OUTPATIENT
Start: 2023-06-12

## 2023-06-13 ENCOUNTER — TRANSCRIBE ORDERS (OUTPATIENT)
Dept: ADMINISTRATIVE | Facility: HOSPITAL | Age: 48
End: 2023-06-13
Payer: COMMERCIAL

## 2023-06-13 DIAGNOSIS — Z12.31 SCREENING MAMMOGRAM FOR BREAST CANCER: Primary | ICD-10-CM

## 2023-08-29 ENCOUNTER — OFFICE VISIT (OUTPATIENT)
Dept: FAMILY MEDICINE CLINIC | Facility: CLINIC | Age: 48
End: 2023-08-29
Payer: COMMERCIAL

## 2023-08-29 VITALS
WEIGHT: 293 LBS | HEIGHT: 65 IN | TEMPERATURE: 97.7 F | SYSTOLIC BLOOD PRESSURE: 126 MMHG | DIASTOLIC BLOOD PRESSURE: 74 MMHG | OXYGEN SATURATION: 96 % | BODY MASS INDEX: 48.82 KG/M2 | HEART RATE: 107 BPM

## 2023-08-29 DIAGNOSIS — R09.81 NASAL CONGESTION: Primary | ICD-10-CM

## 2023-08-29 DIAGNOSIS — J30.89 SEASONAL ALLERGIC RHINITIS DUE TO OTHER ALLERGIC TRIGGER: ICD-10-CM

## 2023-08-29 DIAGNOSIS — I10 ESSENTIAL HYPERTENSION: ICD-10-CM

## 2023-08-29 DIAGNOSIS — H93.8X9 SENSATION OF FULLNESS IN EAR, UNSPECIFIED LATERALITY: ICD-10-CM

## 2023-08-29 DIAGNOSIS — R51.9 NONINTRACTABLE HEADACHE, UNSPECIFIED CHRONICITY PATTERN, UNSPECIFIED HEADACHE TYPE: ICD-10-CM

## 2023-08-29 DIAGNOSIS — E03.8 OTHER SPECIFIED HYPOTHYROIDISM: ICD-10-CM

## 2023-08-29 DIAGNOSIS — R05.9 COUGH, UNSPECIFIED TYPE: ICD-10-CM

## 2023-08-29 LAB
ALBUMIN SERPL-MCNC: 4.6 G/DL (ref 3.5–5.2)
ALBUMIN/GLOB SERPL: 1.4 G/DL
ALP SERPL-CCNC: 96 U/L (ref 39–117)
ALT SERPL W P-5'-P-CCNC: 13 U/L (ref 1–33)
ANION GAP SERPL CALCULATED.3IONS-SCNC: 14 MMOL/L (ref 5–15)
AST SERPL-CCNC: 12 U/L (ref 1–32)
BILIRUB SERPL-MCNC: 0.3 MG/DL (ref 0–1.2)
BUN SERPL-MCNC: 10 MG/DL (ref 6–20)
BUN/CREAT SERPL: 11.9 (ref 7–25)
CALCIUM SPEC-SCNC: 10.1 MG/DL (ref 8.6–10.5)
CHLORIDE SERPL-SCNC: 105 MMOL/L (ref 98–107)
CO2 SERPL-SCNC: 20 MMOL/L (ref 22–29)
CREAT SERPL-MCNC: 0.84 MG/DL (ref 0.57–1)
EGFRCR SERPLBLD CKD-EPI 2021: 86.9 ML/MIN/1.73
EXPIRATION DATE: NORMAL
FLUAV AG UPPER RESP QL IA.RAPID: NOT DETECTED
FLUBV AG UPPER RESP QL IA.RAPID: NOT DETECTED
GLOBULIN UR ELPH-MCNC: 3.4 GM/DL
GLUCOSE SERPL-MCNC: 106 MG/DL (ref 65–99)
INTERNAL CONTROL: NORMAL
Lab: NORMAL
POTASSIUM SERPL-SCNC: 4.2 MMOL/L (ref 3.5–5.2)
PROT SERPL-MCNC: 8 G/DL (ref 6–8.5)
SARS-COV-2 AG UPPER RESP QL IA.RAPID: NOT DETECTED
SODIUM SERPL-SCNC: 139 MMOL/L (ref 136–145)
T4 FREE SERPL-MCNC: 0.69 NG/DL (ref 0.93–1.7)
TSH SERPL DL<=0.05 MIU/L-ACNC: 4.76 UIU/ML (ref 0.27–4.2)

## 2023-08-29 PROCEDURE — 84439 ASSAY OF FREE THYROXINE: CPT | Performed by: FAMILY MEDICINE

## 2023-08-29 PROCEDURE — 80053 COMPREHEN METABOLIC PANEL: CPT | Performed by: FAMILY MEDICINE

## 2023-08-29 PROCEDURE — 87428 SARSCOV & INF VIR A&B AG IA: CPT | Performed by: FAMILY MEDICINE

## 2023-08-29 PROCEDURE — 99214 OFFICE O/P EST MOD 30 MIN: CPT | Performed by: FAMILY MEDICINE

## 2023-08-29 PROCEDURE — 84443 ASSAY THYROID STIM HORMONE: CPT | Performed by: FAMILY MEDICINE

## 2023-08-29 RX ORDER — PSEUDOEPHEDRINE HYDROCHLORIDE 60 MG/1
60 TABLET, FILM COATED ORAL 2 TIMES DAILY
Qty: 60 TABLET | Refills: 0 | Status: SHIPPED | OUTPATIENT
Start: 2023-08-29

## 2023-08-29 RX ORDER — AZITHROMYCIN 250 MG/1
TABLET, FILM COATED ORAL
Qty: 6 TABLET | Refills: 0 | Status: SHIPPED | OUTPATIENT
Start: 2023-08-29

## 2023-08-29 RX ORDER — LISINOPRIL 20 MG/1
20 TABLET ORAL DAILY
Qty: 90 TABLET | Refills: 1 | Status: SHIPPED | OUTPATIENT
Start: 2023-08-29

## 2023-08-29 NOTE — PROGRESS NOTES
"Nilay Xiao     VITALS: Blood pressure 126/74, pulse 107, temperature 97.7 °F (36.5 °C), temperature source Temporal, height 165.1 cm (65\"), weight 134 kg (295 lb 3.2 oz), last menstrual period 07/25/2023, SpO2 96 %, not currently breastfeeding.    Subjective  Chief Complaint  Cough, URI (/), nasal drainage, Ear Fullness, and Headache    Subjective          History of Present Illness:  Patient is a 48 y.o. female with medical conditions significant for anxiety and hypertension who presents to clinic for medical follow-up. She is having sinus issues today. COVID-19 was negative.    She started feeling congested last week. She always has sinus problems. On Saturday, 08/26/2023, she had a severe headache. Normally, she can take sinus medications or Tylenol and it will go away. On Saturday, 08/26/2023, the headache did not resolve. Sunday, 08/27/2023, her symptoms did not resolve. She does not sleep well. Her congestion is worse than normal. She is wondering if she has pneumonia or bronchitis because she has never felt this way. She has some difficulty breathing. Her ears hurt yesterday, 08/29/2023. She had heartburn for 2 to 3 days last week.    She needs a refill on her blood pressure medication.    She has lost 3 pounds.    The following portions of the patient's history were reviewed and updated as appropriate: allergies, current medications, past family history, past medical history, past social history, past surgical history and problem list.    Past Medical History  Past Medical History:   Diagnosis Date    GERD (gastroesophageal reflux disease)     History of Papanicolaou smear of cervix 06/2016    Hypertension     Migraine     Ovarian cyst In my 20s    PMS (premenstrual syndrome)     Varicella Early 80s       Surgical History  Past Surgical History:   Procedure Laterality Date    CYST REMOVAL      from jawbone    ENDOSCOPY      EYE SURGERY Bilateral     lasik    TRUNK LESION/CYST EXCISION Right 3/17/2021    " "Procedure: EXCISION LESION SHOULDER;  Surgeon: Sidra Mackey MD;  Location: [facility];  Service: General;  Laterality: Right;    WISDOM TOOTH EXTRACTION         Family History  Family History   Problem Relation Age of Onset    Diabetes Father         Started when he was older    Kidney nephrosis Mother     Cancer Maternal Aunt     Cancer Paternal Uncle     Breast cancer Neg Hx        Social History  Social History     Socioeconomic History    Marital status:    Tobacco Use    Smoking status: Never    Smokeless tobacco: Never   Vaping Use    Vaping Use: Never used   Substance and Sexual Activity    Alcohol use: Yes     Comment: socially    Drug use: No    Sexual activity: Yes     Partners: Male     Birth control/protection: Condom, OCP, Birth control pill     Comment: Only with my        Objective   Vital Signs:   /74 (BP Location: Right arm, Patient Position: Sitting, Cuff Size: Adult)   Pulse 107   Temp 97.7 °F (36.5 °C) (Temporal)   Ht 165.1 cm (65\")   Wt 134 kg (295 lb 3.2 oz)   SpO2 96%   BMI 49.12 kg/m²     Physical Exam     Gen: Patient in NAD. Pleasant and answers appropriately. A&Ox3.    Skin: Warm and dry with normal turgor. No purpura, rashes, or unusual pigmentation noted. Hair is normal in appearance and distribution.    HEENT: NC/AT. No lesions noted. Conjunctiva clear, sclera nonicteric. PERRL. EOMI without nystagmus or strabismus. Fundi appear benign. No hemorrhages or exudates of eyes. Auditory canals are patent bilaterally without lesions. TMs intact,  nonerythematous, bulging without lesions. Nasal mucosa erythematous, and nonedematous. Frontal and maxillary sinuses are nontender. O/P erythematous and moist without exudate.    Neck: Supple without lymph nodes palpated. FROM.     Lungs: Slightly decreased B/L without rales, rhonchi, crackles, or wheezes.    Heart: RRR. S1 and S2 normal. No S3 or S4. No MRGT.    Abd: Soft, nontender,nondistended. (+)BSx4 quadrants. "     Extrem: No CCE. Radial pulses 2+/4 and equal B/L. FROMx4. No bone, joint, or muscle tenderness noted.    Neuro: No focal motor/sensory deficits.      Procedures    Result Review :   The following data was reviewed by: Stephenie Ewing MD  on 08/29/2023:                Assessment and Plan    This is a 48-year-old female who presents to clinic for medical follow-up.    Sinus congestion.  - A chest x-ray and laboratory blood testing will be ordered today, 08/29/2023. She will be prescribed a Z-Pradip and Sudafed.    Hypertension.  - Her blood pressure medication will be refilled.    Obesity  - She will continue to monitor her weight. She will continue to exercise and follow a balanced diet.    Problem List Items Addressed This Visit    None  Visit Diagnoses       Nasal congestion    -  Primary    Relevant Orders    POCT SARS-CoV-2 Antigen MUNDO + Flu (Completed)    Comprehensive Metabolic Panel    Cough, unspecified type        Relevant Medications    azithromycin (Zithromax Z-Pradip) 250 MG tablet    pseudoephedrine (SUDAFED) 60 MG tablet    Other Relevant Orders    POCT SARS-CoV-2 Antigen MUNDO + Flu (Completed)    Comprehensive Metabolic Panel    XR Chest 2 View    Nonintractable headache, unspecified chronicity pattern, unspecified headache type        Relevant Orders    POCT SARS-CoV-2 Antigen MUNDO + Flu (Completed)    Comprehensive Metabolic Panel    Sensation of fullness in ear, unspecified laterality        Relevant Orders    POCT SARS-CoV-2 Antigen MUNDO + Flu (Completed)    Comprehensive Metabolic Panel    Essential hypertension        Relevant Medications    lisinopril (PRINIVIL,ZESTRIL) 20 MG tablet    Other Relevant Orders    Comprehensive Metabolic Panel    Other specified hypothyroidism        Relevant Orders    TSH    T4, Free    Seasonal allergic rhinitis due to other allergic trigger                Follow Up   Return in about 3 months (around 11/29/2023), or LABS. XRAY.  Findings and plans discussed with  patient who verbalizes understanding and agreement. Will followup with patient once results are in. Patient was given instructions and counseling regarding her condition or for health maintenance advice. Please see specific information pulled into the AVS if appropriate.       Stephenie Ewing MD     Transcribed from ambient dictation for Stephenie Ewing MD by Tj Rhodes.  08/29/23   09:59 EDT    Patient or patient representative verbalized consent to the visit recording.  I have personally performed the services described in this document as transcribed by the above individual, and it is both accurate and complete.

## 2023-09-11 ENCOUNTER — TELEPHONE (OUTPATIENT)
Dept: FAMILY MEDICINE CLINIC | Facility: CLINIC | Age: 48
End: 2023-09-11
Payer: COMMERCIAL

## 2023-09-11 RX ORDER — LEVOTHYROXINE SODIUM 0.03 MG/1
12.5 TABLET ORAL
Qty: 90 TABLET | Refills: 0 | Status: SHIPPED | OUTPATIENT
Start: 2023-09-11

## 2023-09-11 NOTE — TELEPHONE ENCOUNTER
----- Message from Stephenie Ewing MD sent at 9/10/2023  9:24 PM EDT -----  Labs stable. Okay to either call or send letter to patient. Thanks.      Patient notified & verbalized understanding.

## 2023-09-11 NOTE — TELEPHONE ENCOUNTER
----- Message from Stephenie Ewing MD sent at 9/10/2023  9:24 PM EDT -----  Please call the patient regarding her abnormal result. Let her know that all her labs are okay, but her thyroid levels are low and her brain does agree that her thyroid is low. Can I start her on synthroid 12.5 mcg orally daily? If so, please send #90 with no refills to the pharmacy. Thanks.

## 2023-09-11 NOTE — TELEPHONE ENCOUNTER
----- Message from Stephenie Ewing MD sent at 9/10/2023  9:24 PM EDT -----  Please call the patient regarding her abnormal result. Let her know that all her labs are okay, but her thyroid levels are low and her brain does agree that her thyroid is low. Can I start her on synthroid 12.5 mcg orally daily? If so, please send #90 with no refills to the pharmacy. Thanks.    Patient notified & verbalized understanding,agreeable,sent per orders.

## 2023-09-11 NOTE — TELEPHONE ENCOUNTER
----- Message from Stephenie Ewing MD sent at 9/10/2023  9:24 PM EDT -----  Labs stable. Okay to either call or send letter to patient. Thanks.

## 2023-10-20 ENCOUNTER — HOSPITAL ENCOUNTER (OUTPATIENT)
Dept: MAMMOGRAPHY | Facility: HOSPITAL | Age: 48
Discharge: HOME | End: 2023-10-20
Admitting: PHYSICIAN ASSISTANT
Payer: COMMERCIAL

## 2023-10-20 DIAGNOSIS — Z12.31 SCREENING MAMMOGRAM FOR BREAST CANCER: ICD-10-CM

## 2023-10-20 PROCEDURE — 77063 BREAST TOMOSYNTHESIS BI: CPT

## 2023-10-20 PROCEDURE — 77067 SCR MAMMO BI INCL CAD: CPT

## 2023-11-14 ENCOUNTER — TELEPHONE (OUTPATIENT)
Dept: OBSTETRICS AND GYNECOLOGY | Facility: CLINIC | Age: 48
End: 2023-11-14

## 2023-11-14 DIAGNOSIS — Z30.41 ENCOUNTER FOR SURVEILLANCE OF CONTRACEPTIVE PILLS: Primary | ICD-10-CM

## 2023-11-14 RX ORDER — ACETAMINOPHEN AND CODEINE PHOSPHATE 120; 12 MG/5ML; MG/5ML
1 SOLUTION ORAL DAILY
Qty: 84 TABLET | Refills: 0 | Status: SHIPPED | OUTPATIENT
Start: 2023-11-14

## 2023-11-14 NOTE — TELEPHONE ENCOUNTER
Caller: JULIANNAPRISCA SON    Relationship: SELF    Best call back number: [phone]    Requested Prescriptions:   norethindrone (Meredith) 0.35 MG tablet    Pharmacy where request should be sent:    [facility] [place] [facility] - [address] [address] [facility] - [phone]  - [phone] FX Phone: [phone]   Fax: [phone]        Last office visit with prescribing clinician: 9/19/2022   Last telemedicine visit with prescribing clinician: Visit date not found   Next office visit with prescribing clinician: 1/18/2024     Additional details provided by patient: PATIENT IS ON HER LAST HER LAST PACK    Does the patient have less than a 3 day supply:  [] Yes  [x] No    Would you like a call back once the refill request has been completed: [x] Yes [] No    If the office needs to give you a call back, can they leave a voicemail: [x] Yes [] No    Julio Cesar Kurtz Rep   11/14/23 08:48 EST          Assessment:  · Patient reports feeling foggy with unsteady gait  · CT head: Unremarkable  · Recent lipid panel in August 2023 showed elevated triglycerides at 186 and LDL cholesterol at 100  · CBC was monstly unremarkable with WBC minimally elevated at 10.33  · Troponins at 0, 2 hour negative  · UDS, pregnancy test negative  · UA with reflex unremarkable  · POA glucose of 197  · BP on admission was 187/79  · Foggy sensation could be related to elevated BP  · NSR on telemetry  · EKG showed NSR     Plan:  · Placed on stroke pathway  · Neurology consult  · Will order MRI brain  · Permissive hypertension for 24-48 hours  · Start statin 40 mg at night  · Start aspirin 81  · Will order vitamin b12, folate levels  · Continue to monitor on telemetry  · ECHO  · DVT prophylaxis with enoxaparin  · PT, OT

## 2023-12-08 ENCOUNTER — OFFICE VISIT (OUTPATIENT)
Dept: FAMILY MEDICINE CLINIC | Facility: CLINIC | Age: 48
End: 2023-12-08
Payer: COMMERCIAL

## 2023-12-08 VITALS
TEMPERATURE: 97.3 F | DIASTOLIC BLOOD PRESSURE: 86 MMHG | OXYGEN SATURATION: 96 % | HEIGHT: 65 IN | BODY MASS INDEX: 48.82 KG/M2 | WEIGHT: 293 LBS | SYSTOLIC BLOOD PRESSURE: 124 MMHG | HEART RATE: 95 BPM

## 2023-12-08 DIAGNOSIS — E66.01 MORBID (SEVERE) OBESITY DUE TO EXCESS CALORIES: ICD-10-CM

## 2023-12-08 DIAGNOSIS — E03.8 OTHER SPECIFIED HYPOTHYROIDISM: Primary | ICD-10-CM

## 2023-12-08 DIAGNOSIS — I10 ESSENTIAL HYPERTENSION: ICD-10-CM

## 2023-12-08 PROCEDURE — 80053 COMPREHEN METABOLIC PANEL: CPT | Performed by: FAMILY MEDICINE

## 2023-12-08 PROCEDURE — 84443 ASSAY THYROID STIM HORMONE: CPT | Performed by: FAMILY MEDICINE

## 2023-12-08 PROCEDURE — 84439 ASSAY OF FREE THYROXINE: CPT | Performed by: FAMILY MEDICINE

## 2023-12-08 RX ORDER — LEVOTHYROXINE SODIUM 0.03 MG/1
12.5 TABLET ORAL
Qty: 90 TABLET | Refills: 0 | Status: SHIPPED | OUTPATIENT
Start: 2023-12-08

## 2023-12-08 RX ORDER — LISINOPRIL 20 MG/1
20 TABLET ORAL DAILY
Qty: 90 TABLET | Refills: 1 | Status: SHIPPED | OUTPATIENT
Start: 2023-12-08

## 2023-12-09 LAB
ALBUMIN SERPL-MCNC: 4.1 G/DL (ref 3.5–5.2)
ALBUMIN/GLOB SERPL: 1.3 G/DL
ALP SERPL-CCNC: 79 U/L (ref 39–117)
ALT SERPL W P-5'-P-CCNC: 13 U/L (ref 1–33)
ANION GAP SERPL CALCULATED.3IONS-SCNC: 10.5 MMOL/L (ref 5–15)
AST SERPL-CCNC: 12 U/L (ref 1–32)
BILIRUB SERPL-MCNC: <0.2 MG/DL (ref 0–1.2)
BUN SERPL-MCNC: 9 MG/DL (ref 6–20)
BUN/CREAT SERPL: 9 (ref 7–25)
CALCIUM SPEC-SCNC: 9.4 MG/DL (ref 8.6–10.5)
CHLORIDE SERPL-SCNC: 105 MMOL/L (ref 98–107)
CO2 SERPL-SCNC: 22.5 MMOL/L (ref 22–29)
CREAT SERPL-MCNC: 1 MG/DL (ref 0.57–1)
EGFRCR SERPLBLD CKD-EPI 2021: 70.5 ML/MIN/1.73
GLOBULIN UR ELPH-MCNC: 3.2 GM/DL
GLUCOSE SERPL-MCNC: 98 MG/DL (ref 65–99)
POTASSIUM SERPL-SCNC: 3.7 MMOL/L (ref 3.5–5.2)
PROT SERPL-MCNC: 7.3 G/DL (ref 6–8.5)
SODIUM SERPL-SCNC: 138 MMOL/L (ref 136–145)
T4 FREE SERPL-MCNC: 0.81 NG/DL (ref 0.93–1.7)
TSH SERPL DL<=0.05 MIU/L-ACNC: 3.66 UIU/ML (ref 0.27–4.2)

## 2023-12-09 NOTE — PROGRESS NOTES
"Nilay Xiao     VITALS: Blood pressure 124/86, pulse 95, temperature 97.3 °F (36.3 °C), temperature source Temporal, height 165.1 cm (65\"), weight 135 kg (298 lb 9.6 oz), SpO2 96%, not currently breastfeeding.    Subjective  Chief Complaint  Anxiety    Subjective          History of Present Illness:  Patient is a 48 y.o.  female with medical conditions significant for anxiety and hypertension, who presents to clinic for medical follow-up.    Her sinuses are the same. They have improved since last visit. She notes that she was previously prescribed montelukast, which she used to take at night, but it made her worse in the morning. Then she took it in the morning, but it still made her worse, so she quit taking it. She notes that she is not on a nasal spray. She has tried Flonase not too long ago and was not a fan of its administration method. Today has been better for her. She expresses it waxes and wanes. She notes symptoms are usually all year.     She does not see any difference in her symptoms with thyroid medication. She has been feeling more constipated and bloated. She notes that she did not feel bad before she was put on thyroid medication.    Her  thinks that she has sleep apnea. She would like to do the at home testing. She notes that she does not want too many medications. She is taking lisinopril, Synthroid, and birth control.  She used to take vitamin D, but it made her feel old. She notes that she wakes up in the morning with a headache. She knows she is overweight, which also affects her sleeping. She has a cat, and her cat wakes her up all throughout the night. Which she thinks adds to her fatigue. She does not sleep a full night.    She is overweight. She is interested in weight loss medications and bariatric surgery. If she stands up for any length of time, it makes her back hurt. She believes her large breasts are contributing to her pain. She does not count her carbs. She has a treadmill, " "but she does not like it because it goes too slow.    She is aware that she needs to have a colonoscopy, but declines. She would like to put that off until next year. She declines the influenza vaccine.    No complaints about any of the medications.    The following portions of the patient's history were reviewed and updated as appropriate: allergies, current medications, past family history, past medical history, past social history, past surgical history and problem list.    Past Medical History  Past Medical History:   Diagnosis Date    GERD (gastroesophageal reflux disease)     History of Papanicolaou smear of cervix 06/2016    Hypertension     Migraine     Ovarian cyst In my 20s    PMS (premenstrual syndrome)     Varicella Early 80s       Surgical History  Past Surgical History:   Procedure Laterality Date    CYST REMOVAL      from jawbone    ENDOSCOPY      EYE SURGERY Bilateral     lasik    TRUNK LESION/CYST EXCISION Right 3/17/2021    Procedure: EXCISION LESION SHOULDER;  Surgeon: Sidra Mackey MD;  Location: [facility];  Service: General;  Laterality: Right;    WISDOM TOOTH EXTRACTION         Family History  Family History   Problem Relation Age of Onset    Diabetes Father         Started when he was older    Kidney nephrosis Mother     Cancer Maternal Aunt     Cancer Paternal Uncle     Breast cancer Neg Hx        Social History  Social History     Socioeconomic History    Marital status:    Tobacco Use    Smoking status: Never    Smokeless tobacco: Never   Vaping Use    Vaping Use: Never used   Substance and Sexual Activity    Alcohol use: Yes     Comment: socially    Drug use: No    Sexual activity: Yes     Partners: Male     Birth control/protection: Condom, OCP, Birth control pill     Comment: Only with my        Objective   Vital Signs:   /86 (BP Location: Right arm, Patient Position: Sitting, Cuff Size: Adult)   Pulse 95   Temp 97.3 °F (36.3 °C) (Temporal)   Ht 165.1 cm (65\") "   Wt 135 kg (298 lb 9.6 oz)   SpO2 96%   BMI 49.69 kg/m²     Physical Exam     Gen: Patient in NAD. Pleasant and answers appropriately. A&Ox3.    Skin: Warm and dry with normal turgor. No purpura, rashes, or unusual pigmentation noted. Hair is normal in appearance and distribution.    HEENT: NC/AT. No lesions noted. Conjunctiva clear, sclera nonicteric. PERRL. EOMI without nystagmus or strabismus. Fundi appear benign. No hemorrhages or exudates of eyes. Auditory canals are patent bilaterally without lesions. TMs intact,  nonerythematous, bulging without lesions. Nasal mucosa erythematous, and nonedematous. Frontal and maxillary sinuses are nontender. O/P erythematous and moist without exudate.    Neck: Supple without lymph nodes palpated. FROM.     Lungs: Coarse B/L without rales, rhonchi, crackles, or wheezes.    Heart: RRR. S1 and S2 normal. No S3 or S4. No MRGT.    Abd: Soft, nontender,nondistended. (+)BSx4 quadrants.     Extrem: No CCE. Radial pulses 2+/4 and equal B/L. FROMx4.  Positive joint tenderness noted.    Neuro: No focal motor/sensory deficits.    Procedures    Result Review :   The following data was reviewed by: Stephenie Ewing MD  on 12/08/2023:                Assessment and Plan    Nilay Xiao is a 48 y.o. here for medical followup.    Diagnoses and all orders for this visit:    1. Other specified hypothyroidism (Primary)  Comments:  Will continue her current medication regimen. Will recheck her thyroid today.  Orders:  -     levothyroxine (Synthroid) 25 MCG tablet; Take 0.5 tablets by mouth Every Morning.  Dispense: 90 tablet; Refill: 0  -     Comprehensive Metabolic Panel; Future  -     T4, Free; Future  -     TSH; Future  -     Comprehensive Metabolic Panel  -     T4, Free  -     TSH    2. Essential hypertension  Comments:  Blood pressure is well controlled at this time. Continue taking lisinopril.  Orders:  -     lisinopril (PRINIVIL,ZESTRIL) 20 MG tablet; Take 1 tablet by mouth Daily.   Dispense: 90 tablet; Refill: 1  -     Comprehensive Metabolic Panel; Future  -     Comprehensive Metabolic Panel    3. Morbid (severe) obesity due to excess calories  Discussed at length.  Patient is not interested in utilizing diet and exercise and would prefer weight loss medications.  Discussed at length pros and cons of weight loss medications.  Patient to try to initiate some diet and exercise.  Will continue to monitor.        Problem List Items Addressed This Visit    None  Visit Diagnoses       Other specified hypothyroidism    -  Primary    Will continue her current medication regimen. Will recheck her thyroid today.    Relevant Medications    levothyroxine (Synthroid) 25 MCG tablet    Other Relevant Orders    Comprehensive Metabolic Panel (Completed)    T4, Free (Completed)    TSH (Completed)    Essential hypertension        Blood pressure is well controlled at this time. Continue taking lisinopril.    Relevant Medications    lisinopril (PRINIVIL,ZESTRIL) 20 MG tablet    Other Relevant Orders    Comprehensive Metabolic Panel (Completed)          Sinus congestion.  Will start her on Flonase and Claritin daily. Recommended saline spray if she does not like Flonase.     Obesity  Discussed to work on her diet and exercise.    Health maintenance.  Will schedule a colonoscopy for 2024.           Follow Up   Return in about 3 months (around 3/8/2024), or LABS.  Findings and plans discussed with patient who verbalizes understanding and agreement. Will followup with patient once results are in. Patient was given instructions and counseling regarding her condition or for health maintenance advice. Please see specific information pulled into the AVS if appropriate.       Stephenie Ewing MD     Transcribed from ambient dictation for Stephenie Ewing MD by Erin Delvalle.  12/09/23   10:24 EST    Patient or patient representative verbalized consent to the visit recording.  I have personally performed the  services described in this document as transcribed by the above individual, and it is both accurate and complete.

## 2024-01-18 ENCOUNTER — OFFICE VISIT (OUTPATIENT)
Dept: OBSTETRICS AND GYNECOLOGY | Facility: CLINIC | Age: 49
End: 2024-01-18
Payer: COMMERCIAL

## 2024-01-18 VITALS
HEIGHT: 65 IN | SYSTOLIC BLOOD PRESSURE: 128 MMHG | WEIGHT: 293 LBS | DIASTOLIC BLOOD PRESSURE: 82 MMHG | BODY MASS INDEX: 48.82 KG/M2

## 2024-01-18 DIAGNOSIS — Z30.41 ENCOUNTER FOR SURVEILLANCE OF CONTRACEPTIVE PILLS: ICD-10-CM

## 2024-01-18 DIAGNOSIS — Z01.419 ROUTINE GYNECOLOGICAL EXAMINATION: ICD-10-CM

## 2024-01-18 PROCEDURE — 99396 PREV VISIT EST AGE 40-64: CPT | Performed by: PHYSICIAN ASSISTANT

## 2024-01-18 RX ORDER — ACETAMINOPHEN AND CODEINE PHOSPHATE 120; 12 MG/5ML; MG/5ML
1 SOLUTION ORAL DAILY
Qty: 84 TABLET | Refills: 3 | Status: SHIPPED | OUTPATIENT
Start: 2024-01-18

## 2024-01-18 NOTE — PROGRESS NOTES
Subjective   Chief Complaint   Patient presents with    Gynecologic Exam     No pap, Last pap done 22-WNL, MMG done 10/20/23-WNL, No complaints       Nilay Xiao is a 48 y.o. year old  presenting to be seen for her annual gynecological exam.   No complaints or concerns  Desires refills on norethindrone ocp. Has monthly bleeds on ocp  Normal mammogram in october        Past Medical History:   Diagnosis Date    Anxiety     Has increased in the past few years    Fibroid ?    Not sure on this. Was told I had a tumor but they wouldn’t cut it out because it would just grow back    GERD (gastroesophageal reflux disease)     History of Papanicolaou smear of cervix 2016    Hypertension     Migraine     Ovarian cyst In my 20s    PMS (premenstrual syndrome)     Varicella Early         Current Outpatient Medications:     acetaminophen (TYLENOL) 500 MG tablet, Take 1 tablet by mouth Every 6 (Six) Hours As Needed for Mild Pain., Disp: , Rfl:     ibuprofen (ADVIL,MOTRIN) 200 MG tablet, Take 1 tablet by mouth Every 8 (Eight) Hours As Needed for Mild Pain., Disp: , Rfl:     levothyroxine (Synthroid) 25 MCG tablet, Take 0.5 tablets by mouth Every Morning., Disp: 90 tablet, Rfl: 0    lisinopril (PRINIVIL,ZESTRIL) 20 MG tablet, Take 1 tablet by mouth Daily., Disp: 90 tablet, Rfl: 1    norethindrone (Meredith) 0.35 MG tablet, Take 1 tablet by mouth Daily., Disp: 84 tablet, Rfl: 3   Allergies   Allergen Reactions    Ampicillin Rash      Past Surgical History:   Procedure Laterality Date    CYST REMOVAL      from jawbone    ENDOSCOPY      EYE SURGERY Bilateral     lasik    TRUNK LESION/CYST EXCISION Right 3/17/2021    Procedure: EXCISION LESION SHOULDER;  Surgeon: Sidra Mackey MD;  Location: [facility];  Service: General;  Laterality: Right;    WISDOM TOOTH EXTRACTION        Social History     Socioeconomic History    Marital status:    Tobacco Use    Smoking status: Never    Smokeless tobacco: Never  "  Vaping Use    Vaping Use: Never used   Substance and Sexual Activity    Alcohol use: Yes     Comment: 0-2 a month, usually a nita or sangria    Drug use: Never    Sexual activity: Yes     Partners: Male     Birth control/protection: Condom, Birth control pill     Comment: Only with my       Family History   Problem Relation Age of Onset    Diabetes Father         Started when he was older    Kidney nephrosis Mother     Cancer Maternal Aunt     Cancer Paternal Uncle     Osteoporosis Paternal Aunt     Breast cancer Neg Hx        Review of Systems   Constitutional:  Negative for chills, diaphoresis and fever.   Gastrointestinal: Negative.    Genitourinary:  Negative for difficulty urinating, dysuria, menstrual problem and pelvic pain.           Objective   /82   Ht 165.1 cm (65\")   Wt 135 kg (298 lb 3.2 oz)   LMP 01/08/2024 (Exact Date)   BMI 49.62 kg/m²     Physical Exam  Exam conducted with a chaperone present.   Constitutional:       Appearance: Normal appearance. She is well-developed and well-groomed. She is morbidly obese.   Eyes:      General: Lids are normal.      Extraocular Movements: Extraocular movements intact.      Conjunctiva/sclera: Conjunctivae normal.   Chest:   Breasts:     Breasts are symmetrical.      Right: No inverted nipple, mass, nipple discharge, skin change or tenderness.      Left: No inverted nipple, mass, nipple discharge, skin change or tenderness.   Abdominal:      General: There is no distension.      Palpations: Abdomen is soft.      Tenderness: There is no abdominal tenderness.   Genitourinary:     Labia:         Right: No rash, tenderness or lesion.         Left: No rash, tenderness or lesion.       Urethra: No prolapse, urethral pain, urethral swelling or urethral lesion.      Vagina: No vaginal discharge, tenderness or lesions.      Cervix: No cervical motion tenderness, discharge, friability or lesion.      Uterus: Not enlarged and not tender.       Adnexa: "         Right: No mass or tenderness.          Left: No mass or tenderness.     Lymphadenopathy:      Upper Body:      Right upper body: No axillary adenopathy.      Left upper body: No axillary adenopathy.   Neurological:      General: No focal deficit present.      Mental Status: She is alert and oriented to person, place, and time.   Psychiatric:         Attention and Perception: Attention normal.         Mood and Affect: Mood normal.         Speech: Speech normal.         Behavior: Behavior is cooperative.            Result Review :                   Assessment and Plan  Diagnoses and all orders for this visit:    1. Routine gynecological examination    2. Encounter for surveillance of contraceptive pills  -     norethindrone (Meredith) 0.35 MG tablet; Take 1 tablet by mouth Daily.  Dispense: 84 tablet; Refill: 3      Patient Instructions   Self breast exam monthly  Annual screening mammogram starting age 40  Regular exercise             This note was electronically signed.    Augusta Jaramillo PA-C   January 18, 2024

## 2024-03-15 ENCOUNTER — OFFICE VISIT (OUTPATIENT)
Dept: FAMILY MEDICINE CLINIC | Facility: CLINIC | Age: 49
End: 2024-03-15
Payer: COMMERCIAL

## 2024-03-15 VITALS
OXYGEN SATURATION: 97 % | RESPIRATION RATE: 16 BRPM | HEART RATE: 103 BPM | HEIGHT: 65 IN | BODY MASS INDEX: 48.82 KG/M2 | WEIGHT: 293 LBS | DIASTOLIC BLOOD PRESSURE: 82 MMHG | SYSTOLIC BLOOD PRESSURE: 118 MMHG | TEMPERATURE: 97.5 F

## 2024-03-15 DIAGNOSIS — R53.83 OTHER FATIGUE: ICD-10-CM

## 2024-03-15 DIAGNOSIS — Z13.6 ENCOUNTER FOR LIPID SCREENING FOR CARDIOVASCULAR DISEASE: ICD-10-CM

## 2024-03-15 DIAGNOSIS — F41.9 ANXIETY: Primary | ICD-10-CM

## 2024-03-15 DIAGNOSIS — Z13.220 ENCOUNTER FOR LIPID SCREENING FOR CARDIOVASCULAR DISEASE: ICD-10-CM

## 2024-03-15 DIAGNOSIS — E03.8 OTHER SPECIFIED HYPOTHYROIDISM: ICD-10-CM

## 2024-03-15 PROCEDURE — 36415 COLL VENOUS BLD VENIPUNCTURE: CPT | Performed by: FAMILY MEDICINE

## 2024-03-15 PROCEDURE — 84439 ASSAY OF FREE THYROXINE: CPT | Performed by: FAMILY MEDICINE

## 2024-03-15 PROCEDURE — 84443 ASSAY THYROID STIM HORMONE: CPT | Performed by: FAMILY MEDICINE

## 2024-03-15 PROCEDURE — 99214 OFFICE O/P EST MOD 30 MIN: CPT | Performed by: FAMILY MEDICINE

## 2024-03-15 PROCEDURE — 80053 COMPREHEN METABOLIC PANEL: CPT | Performed by: FAMILY MEDICINE

## 2024-03-15 PROCEDURE — 80061 LIPID PANEL: CPT | Performed by: FAMILY MEDICINE

## 2024-03-15 RX ORDER — LEVOTHYROXINE SODIUM 0.03 MG/1
12.5 TABLET ORAL
Qty: 45 TABLET | Refills: 1 | Status: SHIPPED | OUTPATIENT
Start: 2024-03-15

## 2024-03-15 RX ORDER — BUSPIRONE HYDROCHLORIDE 5 MG/1
5 TABLET ORAL AS NEEDED
COMMUNITY
Start: 2024-01-31 | End: 2024-03-15 | Stop reason: SDUPTHER

## 2024-03-15 RX ORDER — BUSPIRONE HYDROCHLORIDE 5 MG/1
5 TABLET ORAL AS NEEDED
Qty: 90 TABLET | Refills: 1 | Status: SHIPPED | OUTPATIENT
Start: 2024-03-15

## 2024-03-15 NOTE — PROGRESS NOTES
"Nilay Xiao     VITALS: Blood pressure 118/82, pulse 103, temperature 97.5 °F (36.4 °C), temperature source Temporal, resp. rate 16, height 165.1 cm (65\"), weight 135 kg (298 lb 3.2 oz), SpO2 97%, not currently breastfeeding.    Subjective  Chief Complaint  Hypothyroidism    Subjective          History of Present Illness:  The patient is a 48-year-old female with medical conditions significant for anxiety and hypertension who presents to clinic secondary to medical follow-up.    Her sinuses and allergies are fine today,. There are instances where they flare with weather changes.    She does not wish to proceed with a sleep test.     She is reluctant to try weight loss medications. She took Adipex years ago and when she stopped taking it, she gained her weight back.      No complaints about any of the medications.    The following portions of the patient's history were reviewed and updated as appropriate: allergies, current medications, past family history, past medical history, past social history, past surgical history and problem list.    Past Medical History  Past Medical History:   Diagnosis Date    Anxiety 2023    Has increased in the past few years    Fibroid 2005?    Not sure on this. Was told I had a tumor but they wouldn’t cut it out because it would just grow back    GERD (gastroesophageal reflux disease)     History of Papanicolaou smear of cervix 06/2016    Hypertension     Migraine     Ovarian cyst In my 20s    PMS (premenstrual syndrome)     Varicella Early 80s       Surgical History  Past Surgical History:   Procedure Laterality Date    CYST REMOVAL      from jawbone    ENDOSCOPY      EYE SURGERY Bilateral     lasik    TRUNK LESION/CYST EXCISION Right 3/17/2021    Procedure: EXCISION LESION SHOULDER;  Surgeon: Sidra Mackey MD;  Location: [facility];  Service: General;  Laterality: Right;    WISDOM TOOTH EXTRACTION         Family History  Family History   Problem Relation Age of Onset    Diabetes " "Father         Started when he was older    Kidney nephrosis Mother     Cancer Maternal Aunt     Cancer Paternal Uncle     Osteoporosis Paternal Aunt     Breast cancer Neg Hx        Social History  Social History     Socioeconomic History    Marital status:    Tobacco Use    Smoking status: Never    Smokeless tobacco: Never   Vaping Use    Vaping status: Never Used   Substance and Sexual Activity    Alcohol use: Yes     Comment: 0-2 a month, usually a nita or sangria    Drug use: Never    Sexual activity: Yes     Partners: Male     Birth control/protection: Condom, Birth control pill     Comment: Only with my        Objective   Vital Signs:   /82 (BP Location: Right arm, Patient Position: Sitting, Cuff Size: Large Adult)   Pulse 103   Temp 97.5 °F (36.4 °C) (Temporal)   Resp 16   Ht 165.1 cm (65\")   Wt 135 kg (298 lb 3.2 oz)   SpO2 97%   BMI 49.62 kg/m²     Physical Exam     Gen: Patient in NAD. Pleasant and answers appropriately. A&Ox3.    Skin: Warm and dry with normal turgor. No purpura, rashes, or unusual pigmentation noted. Hair is normal in appearance and distribution.    HEENT: NC/AT. No lesions noted. Conjunctiva clear, sclera nonicteric. PERRL. EOMI without nystagmus or strabismus. Fundi appear benign. No hemorrhages or exudates of eyes. Auditory canals are patent bilaterally without lesions. TMs intact,  nonerythematous, nonbulging without lesions. Nasal mucosa pink, nonerythematous, and nonedematous. Frontal and maxillary sinuses are nontender. O/P nonerythematous and moist without exudate.    Neck: Supple without lymph nodes palpated. FROM.     Lungs: CTA B/L without rales, rhonchi, crackles, or wheezes.    Heart: RRR. S1 and S2 normal. No S3 or S4. No MRGT.    Abd: Soft, nontender,nondistended. (+)BSx4 quadrants.     Extrem: No CCE. Radial pulses 2+/4 and equal B/L. FROMx4. No bone, joint, or muscle tenderness noted.    Neuro: No focal motor/sensory " deficits.    Procedures    Result Review :   The following data was reviewed by: Stephenie Ewing MD on 03/15/2024:                Assessment and Plan    Nilay Xiao is a 48 y.o. here for medical followup.    Diagnoses and all orders for this visit:    1. Anxiety (Primary)  -     busPIRone (BUSPAR) 5 MG tablet; Take 1 tablet by mouth As Needed (Anxiety).  Dispense: 90 tablet; Refill: 1  -     Comprehensive Metabolic Panel; Future  -     Comprehensive Metabolic Panel    2. Other specified hypothyroidism  Comments:  Will continue her current medication regimen. Will recheck her thyroid today.  Orders:  -     levothyroxine (Synthroid) 25 MCG tablet; Take 0.5 tablets by mouth Every Morning.  Dispense: 45 tablet; Refill: 1  -     Comprehensive Metabolic Panel; Future  -     TSH; Future  -     T4, Free; Future  -     Comprehensive Metabolic Panel  -     TSH  -     T4, Free    3. Other fatigue  -     Ambulatory Referral to Sleep Medicine  -     Comprehensive Metabolic Panel; Future  -     Comprehensive Metabolic Panel    4. Encounter for lipid screening for cardiovascular disease  -     Lipid Panel; Future  -     Lipid Panel          Problem List Items Addressed This Visit    None  Visit Diagnoses       Anxiety    -  Primary    Relevant Medications    busPIRone (BUSPAR) 5 MG tablet    Other Relevant Orders    Comprehensive Metabolic Panel    Other specified hypothyroidism        Will continue her current medication regimen. Will recheck her thyroid today.    Relevant Medications    levothyroxine (Synthroid) 25 MCG tablet    Other Relevant Orders    Comprehensive Metabolic Panel    TSH    T4, Free    Other fatigue        Relevant Orders    Ambulatory Referral to Sleep Medicine (Completed)    Comprehensive Metabolic Panel    Encounter for lipid screening for cardiovascular disease        Relevant Orders    Lipid Panel          1. Sleep apnea.  She is not getting good sleep and that is why she is constantly tired and  snoring. I will order a sleep study.    2.  Obesity  We discussed Wegovy once a week. We discussed the side effects, risks, and benefits. She will do some research and make a list of questions and then we can address it at the next visit.    Follow-up  The patient will follow up in 3 months.    Class 3 Severe Obesity (BMI >=40). Obesity-related health conditions include the following: hypertension. Obesity is worsening. BMI is is above average; BMI management plan is completed. We discussed portion control and increasing exercise.         Follow Up   Return in about 3 months (around 6/15/2024), or LABS.  Findings and plans discussed with patient who verbalizes understanding and agreement. Will followup with patient once results are in. Patient was given instructions and counseling regarding her condition or for health maintenance advice. Please see specific information pulled into the AVS if appropriate.     Stephenie Ewing MD    Transcribed from ambient dictation for Stephenie Ewing MD by Robina Causey.  03/15/24   16:57 EDT    Patient or patient representative verbalized consent to the visit recording.  I have personally performed the services described in this document as transcribed by the above individual, and it is both accurate and complete.    Answers submitted by the patient for this visit:  Other (Submitted on 3/14/2024)  Please describe your symptoms.: Just a follow up  Have you had these symptoms before?: No  How long have you been having these symptoms?: 1-4 days  Primary Reason for Visit (Submitted on 3/14/2024)  What is the primary reason for your visit?: Other

## 2024-03-16 LAB
ALBUMIN SERPL-MCNC: 4 G/DL (ref 3.5–5.2)
ALBUMIN/GLOB SERPL: 1.2 G/DL
ALP SERPL-CCNC: 89 U/L (ref 39–117)
ALT SERPL W P-5'-P-CCNC: 10 U/L (ref 1–33)
ANION GAP SERPL CALCULATED.3IONS-SCNC: 10.5 MMOL/L (ref 5–15)
AST SERPL-CCNC: 11 U/L (ref 1–32)
BILIRUB SERPL-MCNC: <0.2 MG/DL (ref 0–1.2)
BUN SERPL-MCNC: 9 MG/DL (ref 6–20)
BUN/CREAT SERPL: 11.3 (ref 7–25)
CALCIUM SPEC-SCNC: 9.5 MG/DL (ref 8.6–10.5)
CHLORIDE SERPL-SCNC: 106 MMOL/L (ref 98–107)
CHOLEST SERPL-MCNC: 173 MG/DL (ref 0–200)
CO2 SERPL-SCNC: 23.5 MMOL/L (ref 22–29)
CREAT SERPL-MCNC: 0.8 MG/DL (ref 0.57–1)
EGFRCR SERPLBLD CKD-EPI 2021: 92.2 ML/MIN/1.73
GLOBULIN UR ELPH-MCNC: 3.3 GM/DL
GLUCOSE SERPL-MCNC: 89 MG/DL (ref 65–99)
HDLC SERPL-MCNC: 47 MG/DL (ref 40–60)
LDLC SERPL CALC-MCNC: 105 MG/DL (ref 0–100)
LDLC/HDLC SERPL: 2.18 {RATIO}
POTASSIUM SERPL-SCNC: 3.9 MMOL/L (ref 3.5–5.2)
PROT SERPL-MCNC: 7.3 G/DL (ref 6–8.5)
SODIUM SERPL-SCNC: 140 MMOL/L (ref 136–145)
T4 FREE SERPL-MCNC: 0.75 NG/DL (ref 0.93–1.7)
TRIGL SERPL-MCNC: 117 MG/DL (ref 0–150)
TSH SERPL DL<=0.05 MIU/L-ACNC: 4.44 UIU/ML (ref 0.27–4.2)
VLDLC SERPL-MCNC: 21 MG/DL (ref 5–40)

## 2024-06-17 ENCOUNTER — TELEPHONE (OUTPATIENT)
Dept: FAMILY MEDICINE CLINIC | Facility: CLINIC | Age: 49
End: 2024-06-17
Payer: COMMERCIAL

## 2024-06-17 NOTE — TELEPHONE ENCOUNTER
Spoke with patient she reports on Thursday or Friday she felt irritated in her vaginal area so she thought she had a yeast infection,took a treatment for that did not help,now she has a knot like area on the lower vaginal lip that is painful & was swollen but swelling is better,reports she has an appointment on Friday but would like this checked earlier if possible,please advise.

## 2024-06-17 NOTE — TELEPHONE ENCOUNTER
Caller: Julianna Xiao    Relationship: Self    Best call back number: [phone]     What is the best time to reach you: ANYTIME    Who are you requesting to speak with (clinical staff, provider,  specific staff member): CLINICAL    Do you know the name of the person who called: JULIANNA    What was the call regarding: FEELS LIKE SOMETHING HARD IN PUBIC AREA WITH DISCOMFORT AND PAIN.    Is it okay if the provider responds through MyChart: NO

## 2024-06-18 NOTE — TELEPHONE ENCOUNTER
Spoke with patient she reports its a little better she will just wait until her appointment on Friday.

## 2024-06-21 ENCOUNTER — OFFICE VISIT (OUTPATIENT)
Dept: FAMILY MEDICINE CLINIC | Facility: CLINIC | Age: 49
End: 2024-06-21
Payer: COMMERCIAL

## 2024-06-21 VITALS
BODY MASS INDEX: 48.82 KG/M2 | TEMPERATURE: 97.3 F | HEART RATE: 94 BPM | OXYGEN SATURATION: 96 % | SYSTOLIC BLOOD PRESSURE: 126 MMHG | WEIGHT: 293 LBS | HEIGHT: 65 IN | RESPIRATION RATE: 16 BRPM | DIASTOLIC BLOOD PRESSURE: 86 MMHG

## 2024-06-21 DIAGNOSIS — N90.7 LABIAL CYST: ICD-10-CM

## 2024-06-21 DIAGNOSIS — F41.9 ANXIETY: Primary | ICD-10-CM

## 2024-06-21 DIAGNOSIS — E66.01 MORBID (SEVERE) OBESITY DUE TO EXCESS CALORIES: ICD-10-CM

## 2024-06-21 DIAGNOSIS — I10 ESSENTIAL HYPERTENSION: ICD-10-CM

## 2024-06-24 ENCOUNTER — TELEPHONE (OUTPATIENT)
Dept: FAMILY MEDICINE CLINIC | Facility: CLINIC | Age: 49
End: 2024-06-24
Payer: COMMERCIAL

## 2024-06-24 DIAGNOSIS — E03.8 OTHER SPECIFIED HYPOTHYROIDISM: Primary | ICD-10-CM

## 2024-06-24 NOTE — TELEPHONE ENCOUNTER
Patient called indicating she found it cheaper to get labs done elsewhere.  She has asked that we print off orders and put them in mail.

## 2024-07-02 LAB
ALBUMIN SERPL-MCNC: 4 G/DL (ref 3.9–4.9)
ALP SERPL-CCNC: 89 IU/L (ref 44–121)
ALT SERPL-CCNC: 14 IU/L (ref 0–32)
AMBIG ABBREV CMP14 DEFAULT: NORMAL
AST SERPL-CCNC: 13 IU/L (ref 0–40)
BILIRUB SERPL-MCNC: <0.2 MG/DL (ref 0–1.2)
BUN SERPL-MCNC: 12 MG/DL (ref 6–24)
BUN/CREAT SERPL: 14 (ref 9–23)
CALCIUM SERPL-MCNC: 9.5 MG/DL (ref 8.7–10.2)
CHLORIDE SERPL-SCNC: 104 MMOL/L (ref 96–106)
CO2 SERPL-SCNC: 24 MMOL/L (ref 20–29)
CREAT SERPL-MCNC: 0.87 MG/DL (ref 0.57–1)
EGFRCR SERPLBLD CKD-EPI 2021: 83 ML/MIN/1.73
GLOBULIN SER CALC-MCNC: 2.9 G/DL (ref 1.5–4.5)
GLUCOSE SERPL-MCNC: 99 MG/DL (ref 70–99)
POTASSIUM SERPL-SCNC: 4.5 MMOL/L (ref 3.5–5.2)
PROT SERPL-MCNC: 6.9 G/DL (ref 6–8.5)
SODIUM SERPL-SCNC: 140 MMOL/L (ref 134–144)
T4 FREE SERPL-MCNC: 0.69 NG/DL (ref 0.82–1.77)
TSH SERPL DL<=0.005 MIU/L-ACNC: 6.39 UIU/ML (ref 0.45–4.5)

## 2024-07-07 RX ORDER — LISINOPRIL 20 MG/1
20 TABLET ORAL DAILY
Qty: 90 TABLET | Refills: 1 | Status: SHIPPED | OUTPATIENT
Start: 2024-07-07

## 2024-07-08 NOTE — PROGRESS NOTES
"Nilay Xiao     VITALS: Blood pressure 126/86, pulse 94, temperature 97.3 °F (36.3 °C), temperature source Temporal, resp. rate 16, height 165.1 cm (65\"), weight (!) 138 kg (303 lb 9.6 oz), SpO2 96%, not currently breastfeeding.    Subjective  Chief Complaint  Bump on Vagina    Subjective          History of Present Illness:  Patient is a 49 y.o.  female with medical conditions significant for anxiety and depression who presents to clinic secondary to medical followup.  She has numerous concerns.    Patient is worried about a vaginal bump.  She states that she can feel it on the left side of her vagina.  This is a new growth.  She denies any new smells, odors, discharge, itchiness, or pain.    Patient states that she is also having bilateral leg edema.  It does go down overnight and increases during the day.  She states that when she puts her legs up, it disappears.    Patient states that she has been wheezing more than usual.  She denies any shortness of breath.    Patient states that a friend of a friend of a boyfriend is getting vitamin weight loss drops.  She would like some.    No complaints about any of the medications.    The following portions of the patient's history were reviewed and updated as appropriate: allergies, current medications, past family history, past medical history, past social history, past surgical history and problem list.    Past Medical History  Past Medical History:   Diagnosis Date    Allergic     Anxiety 2023    Has increased in the past few years    Fibroid 2005?    Not sure on this. Was told I had a tumor but they wouldn’t cut it out because it would just grow back    GERD (gastroesophageal reflux disease)     History of Papanicolaou smear of cervix 06/2016    Hypertension     Irritable bowel syndrome     Migraine     Obesity     Ovarian cyst In my 20s    PMS (premenstrual syndrome)     Varicella Early 80s    Visual impairment     Lights hurt my eyes at night, blurred vision with " "bad headac       Surgical History  Past Surgical History:   Procedure Laterality Date    CYST REMOVAL      from jawbone    ENDOSCOPY      EYE SURGERY Bilateral     lasik    TRUNK LESION/CYST EXCISION Right 3/17/2021    Procedure: EXCISION LESION SHOULDER;  Surgeon: Sidra Mackey MD;  Location: [facility];  Service: General;  Laterality: Right;    WISDOM TOOTH EXTRACTION         Family History  Family History   Problem Relation Age of Onset    Diabetes Father         Started when he was older    Hearing loss Father     Kidney nephrosis Mother     Cancer Maternal Aunt         Skin cancer    Cancer Paternal Uncle         Skin cancer    Osteoporosis Paternal Aunt     Breast cancer Neg Hx        Social History  Social History     Socioeconomic History    Marital status:    Tobacco Use    Smoking status: Never    Smokeless tobacco: Never   Vaping Use    Vaping status: Never Used   Substance and Sexual Activity    Alcohol use: Yes     Comment: 0-2 a month, usually a nita or sangria    Drug use: Never    Sexual activity: Yes     Partners: Male     Birth control/protection: Condom, Birth control pill     Comment: Only with my        Objective   Vital Signs:   /86 (BP Location: Right arm, Patient Position: Sitting, Cuff Size: Large Adult)   Pulse 94   Temp 97.3 °F (36.3 °C) (Temporal)   Resp 16   Ht 165.1 cm (65\")   Wt (!) 138 kg (303 lb 9.6 oz)   SpO2 96%   BMI 50.52 kg/m²     Physical Exam     Gen: Patient in NAD. Pleasant and answers appropriately. A&Ox3.    Skin: Warm and dry with normal turgor. No purpura, rashes, or unusual pigmentation noted. Hair is normal in appearance and distribution.    HEENT: NC/AT. No lesions noted. Conjunctiva clear, sclera nonicteric. PERRL. EOMI without nystagmus or strabismus. Fundi appear benign. No hemorrhages or exudates of eyes. Auditory canals are patent bilaterally without lesions. TMs intact,  nonerythematous, nonbulging without lesions. Nasal " mucosa pink, nonerythematous, and nonedematous. Frontal and maxillary sinuses are nontender. O/P nonerythematous and moist without exudate.    Neck: Supple without lymph nodes palpated. FROM.     Lungs: Slightly decreased B/L without rales, rhonchi, crackles, or wheezes.    Heart: RRR. S1 and S2 normal. No S3 or S4. No MRGT.    Abd: Soft, nontender,nondistended. (+)BSx4 quadrants.     Extrem: No CC.  Trace edema bilateral lower extremities.  Radial pulses 2+/4 and equal B/L. FROMx4.  Positive joint tenderness noted.    Neuro: No focal motor/sensory deficits.    Pelvic Exam:  Vulva: Nonerythematous.  Labial cyst on the left side. Hair is normal distribution.    Procedures           Assessment and Plan    Nilay Xiao is a 49 y.o. here for medical followup.    Diagnoses and all orders for this visit:    1. Anxiety (Primary)  Continue to monitor.    2. Essential hypertension  Comments:  Blood pressure is well controlled at this time. Continue taking lisinopril.  Orders:  -     lisinopril (PRINIVIL,ZESTRIL) 20 MG tablet; Take 1 tablet by mouth Daily.  Dispense: 90 tablet; Refill: 1    3. Morbid (severe) obesity due to excess calories  Encouraged diet and exercise.  She declines GLP-1 agonists at this time.    4.  Labial cyst.  Discussed with patient extensively.  Discussed we can send her to gynecology for excision or we could have her sit in Epsom salts baths to see if we can get it to resolve.  She is going to try Epsom salt soaks to see if she can get it to resolve.      Problem List Items Addressed This Visit    None  Visit Diagnoses       Anxiety    -  Primary    Essential hypertension        Blood pressure is well controlled at this time. Continue taking lisinopril.    Relevant Medications    lisinopril (PRINIVIL,ZESTRIL) 20 MG tablet    Morbid (severe) obesity due to excess calories                      Follow Up   3 months follow-up  Findings and plans discussed with patient who verbalizes understanding and  agreement. Will followup with patient once results are in. Patient was given instructions and counseling regarding her condition or for health maintenance advice. Please see specific information pulled into the AVS if appropriate.       Stephenie Ewing MD

## 2024-09-05 ENCOUNTER — TRANSCRIBE ORDERS (OUTPATIENT)
Dept: ADMINISTRATIVE | Facility: HOSPITAL | Age: 49
End: 2024-09-05
Payer: COMMERCIAL

## 2024-09-05 DIAGNOSIS — Z12.31 SCREENING MAMMOGRAM FOR BREAST CANCER: Primary | ICD-10-CM

## 2024-09-24 ENCOUNTER — OFFICE VISIT (OUTPATIENT)
Dept: FAMILY MEDICINE CLINIC | Facility: CLINIC | Age: 49
End: 2024-09-24
Payer: COMMERCIAL

## 2024-09-24 VITALS
WEIGHT: 293 LBS | OXYGEN SATURATION: 96 % | DIASTOLIC BLOOD PRESSURE: 74 MMHG | HEIGHT: 65 IN | SYSTOLIC BLOOD PRESSURE: 112 MMHG | TEMPERATURE: 96.8 F | HEART RATE: 83 BPM | BODY MASS INDEX: 48.82 KG/M2 | RESPIRATION RATE: 16 BRPM

## 2024-09-24 DIAGNOSIS — F41.9 ANXIETY: Primary | ICD-10-CM

## 2024-09-24 DIAGNOSIS — Z12.11 ENCOUNTER FOR SCREENING COLONOSCOPY: ICD-10-CM

## 2024-09-24 DIAGNOSIS — E03.8 OTHER SPECIFIED HYPOTHYROIDISM: ICD-10-CM

## 2024-09-24 PROCEDURE — 99214 OFFICE O/P EST MOD 30 MIN: CPT | Performed by: FAMILY MEDICINE

## 2024-09-24 RX ORDER — LEVOTHYROXINE SODIUM 25 UG/1
25 TABLET ORAL
Qty: 90 TABLET | Refills: 0 | Status: SHIPPED | OUTPATIENT
Start: 2024-09-24

## 2024-09-24 RX ORDER — BUSPIRONE HYDROCHLORIDE 5 MG/1
5 TABLET ORAL AS NEEDED
Qty: 90 TABLET | Refills: 1 | Status: SHIPPED | OUTPATIENT
Start: 2024-09-24

## 2024-10-07 DIAGNOSIS — Z12.11 ENCOUNTER FOR SCREENING FOR MALIGNANT NEOPLASM OF COLON: Primary | ICD-10-CM

## 2024-10-07 RX ORDER — POLYETHYLENE GLYCOL 3350 17 G/17G
510 POWDER, FOR SOLUTION ORAL ONCE
Qty: 510 G | Refills: 0 | Status: SHIPPED | OUTPATIENT
Start: 2024-10-07 | End: 2024-10-07

## 2024-10-07 RX ORDER — BISACODYL 5 MG/1
20 TABLET, DELAYED RELEASE ORAL ONCE
Qty: 4 TABLET | Refills: 0 | Status: SHIPPED | OUTPATIENT
Start: 2024-10-07 | End: 2024-10-07

## 2024-10-09 NOTE — PROGRESS NOTES
"Nilay Xiao     VITALS: Blood pressure 112/74, pulse 83, temperature 96.8 °F (36 °C), temperature source Temporal, resp. rate 16, height 165.1 cm (65\"), weight 136 kg (300 lb), last menstrual period 09/10/2024, SpO2 96%, not currently breastfeeding.    Subjective  Chief Complaint  Anxiety and Hypertension    Subjective          History of Present Illness:  Patient is a 49 y.o.  female with medical conditions significant for anxiety and depression who presents to clinic secondary to medical followup.     Patient has a mammogram scheduled November 8.    She agrees to a colonoscopy.    No complaints about any of the medications.    The following portions of the patient's history were reviewed and updated as appropriate: allergies, current medications, past family history, past medical history, past social history, past surgical history and problem list.    Past Medical History  Past Medical History:   Diagnosis Date    Allergic     Anxiety 2023    Has increased in the past few years    Fibroid 2005?    Not sure on this. Was told I had a tumor but they wouldn’t cut it out because it would just grow back    GERD (gastroesophageal reflux disease)     History of Papanicolaou smear of cervix 06/2016    Hypertension     Irritable bowel syndrome     Migraine     Obesity     Ovarian cyst In my 20s    PMS (premenstrual syndrome)     Varicella Early 80s    Visual impairment     Lights hurt my eyes at night, blurred vision with bad headac       Surgical History  Past Surgical History:   Procedure Laterality Date    CYST REMOVAL      from jawbone    ENDOSCOPY      EYE SURGERY Bilateral     lasik    TRUNK LESION/CYST EXCISION Right 3/17/2021    Procedure: EXCISION LESION SHOULDER;  Surgeon: Sidra Mackey MD;  Location: [facility];  Service: General;  Laterality: Right;    WISDOM TOOTH EXTRACTION         Family History  Family History   Problem Relation Age of Onset    Diabetes Father         Started when he was older    Hearing " "loss Father     Kidney nephrosis Mother     Cancer Maternal Aunt         Skin cancer    Cancer Paternal Uncle         Skin cancer    Osteoporosis Paternal Aunt     Breast cancer Neg Hx        Social History  Social History     Socioeconomic History    Marital status:    Tobacco Use    Smoking status: Never    Smokeless tobacco: Never   Vaping Use    Vaping status: Never Used   Substance and Sexual Activity    Alcohol use: Yes     Comment: 0-2 a month, usually a nita or sangria    Drug use: Never    Sexual activity: Yes     Partners: Male     Birth control/protection: Condom, Birth control pill     Comment: Only with my        Objective   Vital Signs:   /74 (BP Location: Right arm, Patient Position: Sitting, Cuff Size: Large Adult)   Pulse 83   Temp 96.8 °F (36 °C) (Temporal)   Resp 16   Ht 165.1 cm (65\")   Wt 136 kg (300 lb)   SpO2 96%   BMI 49.92 kg/m²     Physical Exam     Gen: Patient in NAD. Pleasant and answers appropriately. A&Ox3.    Skin: Warm and dry with normal turgor. No purpura, rashes, or unusual pigmentation noted. Hair is normal in appearance and distribution.    HEENT: NC/AT. No lesions noted. Conjunctiva clear, sclera nonicteric. PERRL. EOMI without nystagmus or strabismus. Fundi appear benign. No hemorrhages or exudates of eyes. Auditory canals are patent bilaterally without lesions. TMs intact,  nonerythematous, nonbulging without lesions. Nasal mucosa pink, nonerythematous, and nonedematous. Frontal and maxillary sinuses are nontender. O/P nonerythematous and moist without exudate.    Neck: Supple without lymph nodes palpated. FROM.     Lungs: CTA B/L without rales, rhonchi, crackles, or wheezes.    Heart: RRR. S1 and S2 normal. No S3 or S4. No MRGT.    Abd: Soft, nontender,nondistended. (+)BSx4 quadrants.     Extrem: No CCE. Radial pulses 2+/4 and equal B/L. FROMx4. No bone, joint, or muscle tenderness noted.    Neuro: No focal motor/sensory " deficits.    Procedures    Result Review :   The following data was reviewed by: Stephenie Ewing MD on 09/24/2024:                Assessment and Plan    Nilay Xiao is a 49 y.o. here for medical followup.    Diagnoses and all orders for this visit:    1. Anxiety (Primary)  -     busPIRone (BUSPAR) 5 MG tablet; Take 1 tablet by mouth As Needed (Anxiety).  Dispense: 90 tablet; Refill: 1    2. Other specified hypothyroidism  -     levothyroxine (Synthroid) 25 MCG tablet; Take 1 tablet by mouth Every Morning.  Dispense: 90 tablet; Refill: 0    3. Encounter for screening colonoscopy  -     Ambulatory Referral For Screening Colonoscopy        Problem List Items Addressed This Visit    None  Visit Diagnoses       Anxiety    -  Primary    Relevant Medications    busPIRone (BUSPAR) 5 MG tablet    Other specified hypothyroidism        Relevant Medications    levothyroxine (Synthroid) 25 MCG tablet    Encounter for screening colonoscopy        Relevant Orders    Ambulatory Referral For Screening Colonoscopy                        Follow Up   Return in about 3 months (around 12/24/2024).  Findings and plans discussed with patient who verbalizes understanding and agreement. Will followup with patient once results are in. Patient was given instructions and counseling regarding her condition or for health maintenance advice. Please see specific information pulled into the AVS if appropriate.       Stephenie Ewing MD

## 2024-11-08 ENCOUNTER — HOSPITAL ENCOUNTER (OUTPATIENT)
Dept: MAMMOGRAPHY | Facility: HOSPITAL | Age: 49
Discharge: HOME | End: 2024-11-08
Admitting: PHYSICIAN ASSISTANT
Payer: COMMERCIAL

## 2024-11-08 DIAGNOSIS — Z12.31 SCREENING MAMMOGRAM FOR BREAST CANCER: ICD-10-CM

## 2024-11-08 PROCEDURE — 77063 BREAST TOMOSYNTHESIS BI: CPT

## 2024-11-08 PROCEDURE — 77067 SCR MAMMO BI INCL CAD: CPT

## 2024-11-21 DIAGNOSIS — E03.8 OTHER SPECIFIED HYPOTHYROIDISM: ICD-10-CM

## 2024-11-21 RX ORDER — LEVOTHYROXINE SODIUM 25 MCG
TABLET ORAL
Qty: 45 TABLET | Refills: 1 | OUTPATIENT
Start: 2024-11-21

## 2024-12-16 ENCOUNTER — HOSPITAL ENCOUNTER (OUTPATIENT)
Facility: HOSPITAL | Age: 49
Setting detail: HOSPITAL OUTPATIENT SURGERY
Discharge: HOME | End: 2024-12-16
Attending: INTERNAL MEDICINE | Admitting: ANESTHESIOLOGY
Payer: COMMERCIAL

## 2024-12-16 ENCOUNTER — ANESTHESIA EVENT (OUTPATIENT)
Dept: PERIOP | Facility: HOSPITAL | Age: 49
End: 2024-12-16
Payer: COMMERCIAL

## 2024-12-16 ENCOUNTER — ANESTHESIA (OUTPATIENT)
Dept: PERIOP | Facility: HOSPITAL | Age: 49
End: 2024-12-16
Payer: COMMERCIAL

## 2024-12-16 VITALS
WEIGHT: 293 LBS | SYSTOLIC BLOOD PRESSURE: 121 MMHG | DIASTOLIC BLOOD PRESSURE: 61 MMHG | OXYGEN SATURATION: 98 % | BODY MASS INDEX: 48.82 KG/M2 | RESPIRATION RATE: 18 BRPM | HEART RATE: 84 BPM | TEMPERATURE: 98.3 F | HEIGHT: 65 IN

## 2024-12-16 DIAGNOSIS — Z12.11 ENCOUNTER FOR SCREENING FOR MALIGNANT NEOPLASM OF COLON: ICD-10-CM

## 2024-12-16 LAB
B-HCG UR QL: NEGATIVE
EXPIRATION DATE: NORMAL
INTERNAL NEGATIVE CONTROL: NORMAL
INTERNAL POSITIVE CONTROL: NORMAL
Lab: NORMAL

## 2024-12-16 PROCEDURE — 81025 URINE PREGNANCY TEST: CPT | Performed by: ANESTHESIOLOGY

## 2024-12-16 PROCEDURE — 25010000002 LIDOCAINE PF 2% 2 % SOLUTION: Performed by: NURSE ANESTHETIST, CERTIFIED REGISTERED

## 2024-12-16 PROCEDURE — 45385 COLONOSCOPY W/LESION REMOVAL: CPT | Performed by: INTERNAL MEDICINE

## 2024-12-16 PROCEDURE — 45381 COLONOSCOPY SUBMUCOUS NJX: CPT | Performed by: INTERNAL MEDICINE

## 2024-12-16 PROCEDURE — 25010000002 PROPOFOL 200 MG/20ML EMULSION: Performed by: NURSE ANESTHETIST, CERTIFIED REGISTERED

## 2024-12-16 DEVICE — CLIPPING DEVICE
Type: IMPLANTABLE DEVICE | Site: CECUM | Status: FUNCTIONAL
Brand: RESOLUTION CLIP

## 2024-12-16 RX ORDER — KETOROLAC TROMETHAMINE 30 MG/ML
30 INJECTION, SOLUTION INTRAMUSCULAR; INTRAVENOUS EVERY 6 HOURS PRN
Status: DISCONTINUED | OUTPATIENT
Start: 2024-12-16 | End: 2024-12-16 | Stop reason: HOSPADM

## 2024-12-16 RX ORDER — ONDANSETRON 2 MG/ML
4 INJECTION INTRAMUSCULAR; INTRAVENOUS AS NEEDED
Status: DISCONTINUED | OUTPATIENT
Start: 2024-12-16 | End: 2024-12-16 | Stop reason: HOSPADM

## 2024-12-16 RX ORDER — PROPOFOL 10 MG/ML
INJECTION, EMULSION INTRAVENOUS CONTINUOUS PRN
Status: DISCONTINUED | OUTPATIENT
Start: 2024-12-16 | End: 2024-12-16 | Stop reason: SURG

## 2024-12-16 RX ORDER — IPRATROPIUM BROMIDE AND ALBUTEROL SULFATE 2.5; .5 MG/3ML; MG/3ML
3 SOLUTION RESPIRATORY (INHALATION) ONCE AS NEEDED
Status: DISCONTINUED | OUTPATIENT
Start: 2024-12-16 | End: 2024-12-16 | Stop reason: HOSPADM

## 2024-12-16 RX ORDER — SODIUM CHLORIDE 9 MG/ML
40 INJECTION, SOLUTION INTRAVENOUS AS NEEDED
Status: DISCONTINUED | OUTPATIENT
Start: 2024-12-16 | End: 2024-12-16 | Stop reason: HOSPADM

## 2024-12-16 RX ORDER — SODIUM CHLORIDE 0.9 % (FLUSH) 0.9 %
10 SYRINGE (ML) INJECTION EVERY 12 HOURS SCHEDULED
Status: DISCONTINUED | OUTPATIENT
Start: 2024-12-16 | End: 2024-12-16 | Stop reason: HOSPADM

## 2024-12-16 RX ORDER — MEPERIDINE HYDROCHLORIDE 25 MG/ML
12.5 INJECTION INTRAMUSCULAR; INTRAVENOUS; SUBCUTANEOUS
Status: DISCONTINUED | OUTPATIENT
Start: 2024-12-16 | End: 2024-12-16 | Stop reason: HOSPADM

## 2024-12-16 RX ORDER — FENTANYL CITRATE 50 UG/ML
50 INJECTION, SOLUTION INTRAMUSCULAR; INTRAVENOUS
Status: DISCONTINUED | OUTPATIENT
Start: 2024-12-16 | End: 2024-12-16 | Stop reason: HOSPADM

## 2024-12-16 RX ORDER — SODIUM CHLORIDE, SODIUM LACTATE, POTASSIUM CHLORIDE, CALCIUM CHLORIDE 600; 310; 30; 20 MG/100ML; MG/100ML; MG/100ML; MG/100ML
125 INJECTION, SOLUTION INTRAVENOUS ONCE
Status: DISCONTINUED | OUTPATIENT
Start: 2024-12-16 | End: 2024-12-16 | Stop reason: HOSPADM

## 2024-12-16 RX ORDER — OXYCODONE AND ACETAMINOPHEN 5; 325 MG/1; MG/1
1 TABLET ORAL ONCE AS NEEDED
Status: DISCONTINUED | OUTPATIENT
Start: 2024-12-16 | End: 2024-12-16 | Stop reason: HOSPADM

## 2024-12-16 RX ORDER — MIDAZOLAM HYDROCHLORIDE 1 MG/ML
1 INJECTION, SOLUTION INTRAMUSCULAR; INTRAVENOUS
Status: DISCONTINUED | OUTPATIENT
Start: 2024-12-16 | End: 2024-12-16 | Stop reason: HOSPADM

## 2024-12-16 RX ORDER — SODIUM CHLORIDE 0.9 % (FLUSH) 0.9 %
10 SYRINGE (ML) INJECTION AS NEEDED
Status: DISCONTINUED | OUTPATIENT
Start: 2024-12-16 | End: 2024-12-16 | Stop reason: HOSPADM

## 2024-12-16 RX ORDER — SODIUM CHLORIDE, SODIUM LACTATE, POTASSIUM CHLORIDE, CALCIUM CHLORIDE 600; 310; 30; 20 MG/100ML; MG/100ML; MG/100ML; MG/100ML
100 INJECTION, SOLUTION INTRAVENOUS ONCE AS NEEDED
Status: DISCONTINUED | OUTPATIENT
Start: 2024-12-16 | End: 2024-12-16 | Stop reason: HOSPADM

## 2024-12-16 RX ORDER — LIDOCAINE HYDROCHLORIDE 20 MG/ML
INJECTION, SOLUTION EPIDURAL; INFILTRATION; INTRACAUDAL; PERINEURAL AS NEEDED
Status: DISCONTINUED | OUTPATIENT
Start: 2024-12-16 | End: 2024-12-16 | Stop reason: SURG

## 2024-12-16 RX ADMIN — PROPOFOL 200 MCG/KG/MIN: 10 INJECTION, EMULSION INTRAVENOUS at 12:58

## 2024-12-16 RX ADMIN — LIDOCAINE HYDROCHLORIDE 60 MG: 20 INJECTION, SOLUTION EPIDURAL; INFILTRATION; INTRACAUDAL; PERINEURAL at 12:58

## 2024-12-16 NOTE — ANESTHESIA PREPROCEDURE EVALUATION
Anesthesia Evaluation     Patient summary reviewed and Nursing notes reviewed   no history of anesthetic complications:   NPO Solid Status: > 8 hours  NPO Liquid Status: > 8 hours           Airway   Mallampati: II  TM distance: >3 FB  Neck ROM: full  No difficulty expected  Dental    (+) poor dentition        Pulmonary - negative pulmonary ROS    breath sounds clear to auscultation  Cardiovascular   Exercise tolerance: excellent (>7 METS)    Rhythm: regular  Rate: normal    (+) hypertension      Neuro/Psych  (+) headaches, psychiatric history Depression  GI/Hepatic/Renal/Endo    (+) obesity, morbid obesity, GERD    Musculoskeletal (-) negative ROS    Abdominal   (+) obese    Abdomen: soft.   Substance History - negative use     OB/GYN negative ob/gyn ROS         Other - negative ROS                     Anesthesia Plan    ASA 3     general     intravenous induction     Anesthetic plan, risks, benefits, and alternatives have been provided, discussed and informed consent has been obtained with: patient.  Pre-procedure education provided  Use of blood products discussed with  Consented to blood products.    Plan discussed with CRNA.

## 2024-12-16 NOTE — ANESTHESIA POSTPROCEDURE EVALUATION
Patient: Nilay Xiao    Procedure Summary       Date: 12/16/24 Room / Location: [facility] OR  /  [facility] OR    Anesthesia Start: 1254 Anesthesia Stop: 1419    Procedure: COLONOSCOPY Diagnosis:       Encounter for screening for malignant neoplasm of colon      (Encounter for screening for malignant neoplasm of colon [Z12.11])    Surgeons: Charly Fuentes MD Provider: Edgar Da Silva MD    Anesthesia Type: general ASA Status: 3            Anesthesia Type: general    Vitals  Vitals Value Taken Time   /61 12/16/24 1450   Temp 98.3 °F (36.8 °C) 12/16/24 1420   Pulse 84 12/16/24 1450   Resp 18 12/16/24 1450   SpO2 98 % 12/16/24 1450           Post Anesthesia Care and Evaluation    Patient location during evaluation: PHASE II  Patient participation: complete - patient participated  Level of consciousness: awake and alert  Pain score: 1  Pain management: adequate    Airway patency: patent  Anesthetic complications: No anesthetic complications  PONV Status: controlled  Cardiovascular status: acceptable  Respiratory status: acceptable and room air  Hydration status: euvolemic  No anesthesia care post op

## 2024-12-16 NOTE — H&P
GASTROENTEROLOGY OFFICE NOTE  Nilay Xiao  [id number]  1975      CHIEF COMPLAINT  Colon cancer screening    HISTORY OF PRESENT ILLNESS:  49-year-old white female presents for screening colonoscopy denying dysphagia, odynophagia, early satiety, unexplained weight loss, melena or bright red blood per rectum.    PAST MEDICAL HISTORY  Past Medical History:    Allergic    Anxiety    Has increased in the past few years    Fibroid    Not sure on this. Was told I had a tumor but they wouldn’t cut it out because it would just grow back    GERD (gastroesophageal reflux disease)    History of Papanicolaou smear of cervix    Hypertension    Irritable bowel syndrome    Migraine    Obesity    Ovarian cyst    PMS (premenstrual syndrome)    Varicella    Visual impairment    Lights hurt my eyes at night, blurred vision with bad headac        PAST SURGICAL HISTORY  Past Surgical History:    CYST REMOVAL    from jawbone    ENDOSCOPY    EYE SURGERY    lasik    TRUNK LESION/CYST EXCISION    Procedure: EXCISION LESION SHOULDER;  Surgeon: Sidra Mackey MD;  Location: [facility];  Service: General;  Laterality: Right;    WISDOM TOOTH EXTRACTION        MEDICATIONS:  acetaminophen, busPIRone, ibuprofen, levothyroxine, lisinopril, and norethindrone     ALLERGIES  is allergic to ampicillin.    FAMILY HISTORY:  Cancer-related family history includes Cancer in her maternal aunt and paternal uncle. There is no history of Breast cancer or Ovarian cancer.  Colon Cancer-related family history is not on file.    SOCIAL HISTORY  She  reports that she has never smoked. She has never used smokeless tobacco. She reports current alcohol use. She reports that she does not use drugs.   .  She does not have any children.  She is a non-smoker.  She rarely drinks alcoholic beverages.  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    REVIEW OF SYSTEMS  Cardiovascular, pulmonary and generalized review of systems are pertinent  "as reviewed above    PHYSICAL EXAM   BP 99/72 (BP Location: Left arm, Patient Position: Sitting)   Pulse 94   Temp 98.4 °F (36.9 °C) (Tympanic)   Resp 18   Ht 165.1 cm (65\")   Wt 136 kg (300 lb)   LMP 11/23/2024 (Exact Date)   SpO2 97%   BMI 49.92 kg/m²   General: Alert and oriented x 3. In no apparent or acute distress.  and No stigmata of chronic liver disease  HEENT: Anicteric sclerae. Normal oropharynx  Neck: Supple. Without lymphadenopathy  CV: Regular rate and rhythm, S1, S2  Lungs: Clear to ausculation. Without rales, rhonchi and wheezing  Abdomen:  Soft,non-distended without palpable masses or hepatosplenomeagaly, areas of rebound tenderness or guarding.   Extremeties: without clubbing, cyanosis or edema  Neurologic:  Alert and oriented x 3 without focal motor or sensory deficits  Rectal exam: deferred         ASSESSMENT  1.-Patient presents for screening colonoscopy.  Risk of perforation, bleeding, cardiorespiratory [place] and missed lesions reviewed.  Ample operative question provided.  She wishes to proceed    PLAN  1.-Proceed with screening colonoscopy with further recommendation deferred pending findings of today's study      Charly Fuentes MD  12/16/2024   12:45 EST      Large sessile cecal polyps were lifted submucosal saline removed snare and endoclips requiring 10 clips to close all the defects.  5 large ascending colon polyps 2 small transverse colon polyp and 1 large pedunculated descending colon polyps were all removed.  Surveillance is recommended in 3 months to reassess piecemeal polypectomy sites in the cecum and ascending colon              "

## 2024-12-18 LAB — REF LAB TEST METHOD: NORMAL

## 2024-12-25 DIAGNOSIS — Z30.41 ENCOUNTER FOR SURVEILLANCE OF CONTRACEPTIVE PILLS: ICD-10-CM

## 2024-12-27 RX ORDER — NORETHINDRONE 0.35 MG/1
1 TABLET ORAL DAILY
Qty: 84 TABLET | Refills: 0 | Status: SHIPPED | OUTPATIENT
Start: 2024-12-27

## 2024-12-30 DIAGNOSIS — Z12.11 ENCOUNTER FOR SCREENING FOR MALIGNANT NEOPLASM OF COLON: Primary | ICD-10-CM

## 2025-01-17 ENCOUNTER — OFFICE VISIT (OUTPATIENT)
Dept: FAMILY MEDICINE CLINIC | Facility: CLINIC | Age: 50
End: 2025-01-17
Payer: COMMERCIAL

## 2025-01-17 ENCOUNTER — LAB (OUTPATIENT)
Dept: FAMILY MEDICINE CLINIC | Facility: CLINIC | Age: 50
End: 2025-01-17
Payer: COMMERCIAL

## 2025-01-17 VITALS
BODY MASS INDEX: 48.82 KG/M2 | TEMPERATURE: 97.7 F | OXYGEN SATURATION: 98 % | HEIGHT: 65 IN | HEART RATE: 83 BPM | DIASTOLIC BLOOD PRESSURE: 60 MMHG | SYSTOLIC BLOOD PRESSURE: 126 MMHG | WEIGHT: 293 LBS

## 2025-01-17 DIAGNOSIS — E03.9 HYPOTHYROIDISM, UNSPECIFIED TYPE: ICD-10-CM

## 2025-01-17 DIAGNOSIS — E03.9 HYPOTHYROIDISM, UNSPECIFIED TYPE: Primary | ICD-10-CM

## 2025-01-17 DIAGNOSIS — R05.9 COUGH, UNSPECIFIED TYPE: ICD-10-CM

## 2025-01-17 DIAGNOSIS — I10 ESSENTIAL HYPERTENSION: ICD-10-CM

## 2025-01-17 PROCEDURE — 80053 COMPREHEN METABOLIC PANEL: CPT | Performed by: FAMILY MEDICINE

## 2025-01-17 PROCEDURE — 84443 ASSAY THYROID STIM HORMONE: CPT | Performed by: FAMILY MEDICINE

## 2025-01-17 PROCEDURE — 84439 ASSAY OF FREE THYROXINE: CPT | Performed by: FAMILY MEDICINE

## 2025-01-17 PROCEDURE — 99214 OFFICE O/P EST MOD 30 MIN: CPT | Performed by: FAMILY MEDICINE

## 2025-01-17 RX ORDER — GUAIFENESIN 600 MG/1
1200 TABLET, EXTENDED RELEASE ORAL 2 TIMES DAILY
Qty: 120 TABLET | Refills: 0 | Status: SHIPPED | OUTPATIENT
Start: 2025-01-17

## 2025-01-18 LAB
ALBUMIN SERPL-MCNC: 3.7 G/DL (ref 3.5–5.2)
ALBUMIN/GLOB SERPL: 1 G/DL
ALP SERPL-CCNC: 87 U/L (ref 39–117)
ALT SERPL W P-5'-P-CCNC: 15 U/L (ref 1–33)
ANION GAP SERPL CALCULATED.3IONS-SCNC: 10.5 MMOL/L (ref 5–15)
AST SERPL-CCNC: 13 U/L (ref 1–32)
BILIRUB SERPL-MCNC: <0.2 MG/DL (ref 0–1.2)
BUN SERPL-MCNC: 10 MG/DL (ref 6–20)
BUN/CREAT SERPL: 10.3 (ref 7–25)
CALCIUM SPEC-SCNC: 9.7 MG/DL (ref 8.6–10.5)
CHLORIDE SERPL-SCNC: 104 MMOL/L (ref 98–107)
CO2 SERPL-SCNC: 24.5 MMOL/L (ref 22–29)
CREAT SERPL-MCNC: 0.97 MG/DL (ref 0.57–1)
EGFRCR SERPLBLD CKD-EPI 2021: 72.7 ML/MIN/1.73
GLOBULIN UR ELPH-MCNC: 3.6 GM/DL
GLUCOSE SERPL-MCNC: 87 MG/DL (ref 65–99)
POTASSIUM SERPL-SCNC: 3.7 MMOL/L (ref 3.5–5.2)
PROT SERPL-MCNC: 7.3 G/DL (ref 6–8.5)
SODIUM SERPL-SCNC: 139 MMOL/L (ref 136–145)
T4 FREE SERPL-MCNC: 0.69 NG/DL (ref 0.92–1.68)
TSH SERPL DL<=0.05 MIU/L-ACNC: 4.18 UIU/ML (ref 0.27–4.2)

## 2025-01-28 DIAGNOSIS — E03.8 OTHER SPECIFIED HYPOTHYROIDISM: ICD-10-CM

## 2025-01-28 RX ORDER — LEVOTHYROXINE SODIUM 25 UG/1
25 TABLET ORAL
Qty: 90 TABLET | Refills: 0 | Status: SHIPPED | OUTPATIENT
Start: 2025-01-28

## 2025-01-28 NOTE — TELEPHONE ENCOUNTER
Caller: Julianna Xiao    Relationship: Self    Best call back number: [phone]     Requested Prescriptions:   Requested Prescriptions     Pending Prescriptions Disp Refills    levothyroxine (Synthroid) 25 MCG tablet 90 tablet 0     Sig: Take 1 tablet by mouth Every Morning.        Pharmacy where request should be sent: [facility] - [facility]IR[facility] HOWARD - [address] [facility] AT [address] [address] [facility] - [phone]  - [phone]     Last office visit with prescribing clinician: 1/17/2025   Last telemedicine visit with prescribing clinician: Visit date not found   Next office visit with prescribing clinician: 4/25/2025     Additional details provided by patient: JULIANNA NEEDS  A REFILL AND ASKS IF YOU WOULD LIKE TO CHANGE THE DOSAGE BASED ON RECENT LABS?    Does the patient have less than a 3 day supply:  [x] Yes  [] No    Would you like a call back once the refill request has been completed: [] Yes [] No    If the office needs to give you a call back, can they leave a voicemail: [] Yes [] No    Julio Cesar Wilkes Rep   01/28/25 13:06 EST

## 2025-01-30 DIAGNOSIS — I10 ESSENTIAL HYPERTENSION: ICD-10-CM

## 2025-01-30 RX ORDER — LISINOPRIL 20 MG/1
20 TABLET ORAL DAILY
Qty: 90 TABLET | Refills: 1 | Status: SHIPPED | OUTPATIENT
Start: 2025-01-30

## 2025-02-03 NOTE — PROGRESS NOTES
"Nilay Xiao     VITALS: Blood pressure 126/60, pulse 83, temperature 97.7 °F (36.5 °C), temperature source Temporal, height 165.1 cm (65\"), weight (!) 136 kg (300 lb 12.8 oz), last menstrual period 11/23/2024, SpO2 98%, not currently breastfeeding.    Subjective  Chief Complaint  Cough and Nasal Congestion    Subjective          History of Present Illness:    History of Present Illness  The patient is a 49-year-old female with medical conditions significant for anxiety and depression who presents to the clinic for a medical follow-up.    She reports experiencing congestion, which she attributes to a sinus infection rather than influenza or COVID-19. She recalls an incident at [facility] where she was exposed to a man who was persistently sneezing. She also mentions that certain perfumes and smells typically bother her. She experienced a tickling sensation in her nose, which she associates with sinus issues. The following day, she woke up with stuffy nose and congestion. Prior to her trip to [place], she experienced an unusual sensation prompting her to cough, which she describes as a pressure rather than a tickle. She speculates that this could be indicative of asthma, despite having no history of the condition in her childhood. She also notes that her symptoms tend to worsen with increased physical activity. Upon returning home, she experienced severe facial pain on one side, a symptom she typically associates with sinus issues. She also reports dryness and bleeding in her nose. She has not been using Flonase, but has been self-medicating with DayQuil and NyQuil. She expresses concern about potential fluid accumulation in her ear. She has been tested positive for COVID-19.    She has been on a regimen of half a pill of thyroid medication, which was recently increased to a full pill. She usually takes her thyroid medication around 7:00 or 7:30 in the morning.    Supplemental Information  She takes " lisinopril in the evening for blood pressure.      No complaints regarding medications.     The following portions of the patient's history were reviewed and updated as appropriate: allergies, current medications, past family history, past medical history, past social history, past surgical history and problem list.    Past Medical History  Past Medical History:   Diagnosis Date    Allergic     Anxiety 2023    Has increased in the past few years    Fibroid 2005?    Not sure on this. Was told I had a tumor but they wouldn’t cut it out because it would just grow back    GERD (gastroesophageal reflux disease)     History of Papanicolaou smear of cervix 06/2016    Hypertension     Irritable bowel syndrome     Migraine     Obesity     Ovarian cyst In my 20s    PMS (premenstrual syndrome)     Varicella Early 80s    Visual impairment     Lights hurt my eyes at night, blurred vision with bad headac       Surgical History  Past Surgical History:   Procedure Laterality Date    COLONOSCOPY N/A 12/16/2024    Procedure: COLONOSCOPY;  Surgeon: Charly Fuentes MD;  Location: [facility];  Service: Gastroenterology;  Laterality: N/A;    CYST REMOVAL      from jawbone    ENDOSCOPY      EYE SURGERY Bilateral     lasik    TRUNK LESION/CYST EXCISION Right 03/17/2021    Procedure: EXCISION LESION SHOULDER;  Surgeon: Sidra Mackey MD;  Location: [facility];  Service: General;  Laterality: Right;    WISDOM TOOTH EXTRACTION         Family History  Family History   Problem Relation Age of Onset    Kidney nephrosis Mother     Diabetes Father         Started when he was older    Hearing loss Father     Cancer Maternal Aunt         Skin cancer    Osteoporosis Paternal Aunt     Cancer Paternal Uncle         Skin cancer    Breast cancer Neg Hx     Ovarian cancer Neg Hx        Social History  Social History     Socioeconomic History    Marital status:    Tobacco Use    Smoking status: Never    Smokeless tobacco: Never  "  Vaping Use    Vaping status: Never Used   Substance and Sexual Activity    Alcohol use: Yes     Comment: 0-2 a month, usually a nita or sangria    Drug use: Never    Sexual activity: Yes     Partners: Male     Birth control/protection: Condom, Birth control pill     Comment: Only with my        Objective   Vital Signs:   /60 (BP Location: Right arm, Patient Position: Sitting)   Pulse 83   Temp 97.7 °F (36.5 °C) (Temporal)   Ht 165.1 cm (65\")   Wt (!) 136 kg (300 lb 12.8 oz)   SpO2 98%   BMI 50.06 kg/m²       Physical Exam     Physical Exam  Fluid is present behind the eardrum. Oral exam was performed.  Lungs are clear.    Gen: Patient in NAD. Pleasant and answers appropriately. A&Ox3.    Skin: Warm and dry with normal turgor. No purpura, rashes, or unusual pigmentation noted. Hair is normal in appearance and distribution.    HEENT: NC/AT. No lesions noted. Conjunctiva clear, sclera nonicteric. PERRL. EOMI without nystagmus or strabismus. Fundi appear benign. No hemorrhages or exudates of eyes. Auditory canals are patent bilaterally without lesions. TMs intact,  nonerythematous, bulging without lesions. Nasal mucosa erythematous, and nonedematous. Frontal and maxillary sinuses are nontender. O/P erythematous and moist without exudate.    Neck: Supple without lymph nodes palpated. FROM. No carotid bruits appreciated bilaterally.    Lungs: Decreased B/L without rales, rhonchi, crackles, or wheezes.    Heart: RRR. S1 and S2 normal. No S3 or S4. No MRGT.    Abd: Soft, nontender,nondistended. (+)BSx4 quadrants.     Extrem: No CCE. Radial pulses 2+/4 and equal B/L. FROMx4. No bone, joint, or muscle tenderness noted.    Neuro: No focal motor/sensory deficits.    Procedures    Result Review :   The following data was reviewed by: Stephenie Ewing MD on 01/17/2025:       Results             Assessment and Plan      Nilay Xiao is a 49 y.o. here for medical followup.    Diagnoses and all orders for " this visit:    1. Hypothyroidism, unspecified type (Primary)  -     Comprehensive Metabolic Panel; Future  -     T4, Free; Future  -     TSH; Future    2. Cough, unspecified type  -     Comprehensive Metabolic Panel; Future  -     guaiFENesin (Mucinex) 600 MG 12 hr tablet; Take 2 tablets by mouth 2 (Two) Times a Day.  Dispense: 120 tablet; Refill: 0  -     pseudoephedrine (SUDAFED) 60 MG tablet; Take 1 tablet by mouth 2 (Two) Times a Day.  Dispense: 60 tablet; Refill: 0    3. Essential hypertension  -     Comprehensive Metabolic Panel; Future        Assessment & Plan  1. Sinusitis.  Symptoms suggest a viral etiology, potentially COVID-19 or influenza, rather than a bacterial cause. The cough is likely due to postnasal drip. She is advised to use a saline spray followed by Flonase twice daily. Prescriptions for Sudafed and Mucinex have been provided, to be taken twice daily. A chest x-ray has been recommended to rule out pneumonia, but she has declined this at present. She is instructed to take her morning medications at night and her evening medications with lisinopril. If symptoms persist beyond 6 weeks, she will contact the clinic.    2. Abnormal thyroid levels.  Thyroid labs will be conducted today. She is advised to continue her current thyroid medication regimen, taking it 30 minutes before breakfast. She will be on a 3-month rotation for follow-up due to abnormal thyroid levels.               Patient or patient representative verbalized consent for the use of Ambient Listening during the visit with  Stephenie Ewing MD for chart documentation. 2/3/2025  07:57 EST        Follow Up   Return in about 3 months (around 4/17/2025), or LABS.  Findings and plans discussed with patient who verbalizes understanding and agreement. Will followup with patient once results are in. Patient was given instructions and counseling regarding her condition or for health maintenance advice. Please see specific information pulled  into the AVS if appropriate.       Stephenie Ewing MD

## 2025-03-12 ENCOUNTER — OFFICE VISIT (OUTPATIENT)
Dept: OBSTETRICS AND GYNECOLOGY | Facility: CLINIC | Age: 50
End: 2025-03-12
Payer: COMMERCIAL

## 2025-03-12 VITALS
DIASTOLIC BLOOD PRESSURE: 68 MMHG | SYSTOLIC BLOOD PRESSURE: 122 MMHG | HEIGHT: 65 IN | WEIGHT: 293 LBS | BODY MASS INDEX: 48.82 KG/M2

## 2025-03-12 DIAGNOSIS — Z30.41 ENCOUNTER FOR SURVEILLANCE OF CONTRACEPTIVE PILLS: ICD-10-CM

## 2025-03-12 DIAGNOSIS — Z01.419 WELL WOMAN EXAM WITH ROUTINE GYNECOLOGICAL EXAM: Primary | ICD-10-CM

## 2025-03-12 DIAGNOSIS — Z12.4 SCREENING FOR CERVICAL CANCER: ICD-10-CM

## 2025-03-12 RX ORDER — NORETHINDRONE 0.35 MG/1
1 TABLET ORAL DAILY
Qty: 84 TABLET | Refills: 0 | Status: SHIPPED | OUTPATIENT
Start: 2025-03-12 | End: 2025-03-12 | Stop reason: SDUPTHER

## 2025-03-12 RX ORDER — ACETAMINOPHEN AND CODEINE PHOSPHATE 120; 12 MG/5ML; MG/5ML
1 SOLUTION ORAL DAILY
Qty: 84 TABLET | Refills: 4 | Status: SHIPPED | OUTPATIENT
Start: 2025-03-12

## 2025-03-12 NOTE — PROGRESS NOTES
Subjective   Chief Complaint   Patient presents with    Gynecologic Exam     Last pap done 22-WNL, MMG is due, No complaints       Nilay Xiao is a 49 y.o. year old  presenting to be seen for her annual gynecological exam.   No complaints or concerns  She had a screening mammogram 2024  She desires refills of her norethindrone OCP.  Some months she will have a bleed on the OCP and other months she does not have bleeds    Past Medical History:   Diagnosis Date    Allergic     Anxiety     Has increased in the past few years    Fibroid ?    Not sure on this. Was told I had a tumor but they wouldn’t cut it out because it would just grow back    GERD (gastroesophageal reflux disease)     History of Papanicolaou smear of cervix 2016    Hypertension     Irritable bowel syndrome     Migraine     Obesity     Ovarian cyst In my 20s    PMS (premenstrual syndrome)     Varicella Early     Visual impairment     Lights hurt my eyes at night, blurred vision with bad headac        Current Outpatient Medications:     acetaminophen (TYLENOL) 500 MG tablet, Take 1 tablet by mouth Every 6 (Six) Hours As Needed for Mild Pain., Disp: , Rfl:     busPIRone (BUSPAR) 5 MG tablet, Take 1 tablet by mouth As Needed (Anxiety)., Disp: 90 tablet, Rfl: 1    guaiFENesin (Mucinex) 600 MG 12 hr tablet, Take 2 tablets by mouth 2 (Two) Times a Day., Disp: 120 tablet, Rfl: 0    ibuprofen (ADVIL,MOTRIN) 200 MG tablet, Take 1 tablet by mouth Every 8 (Eight) Hours As Needed for Mild Pain., Disp: , Rfl:     levothyroxine (Synthroid) 25 MCG tablet, Take 1 tablet by mouth Every Morning., Disp: 90 tablet, Rfl: 0    lisinopril (PRINIVIL,ZESTRIL) 20 MG tablet, TAKE 1 TABLET DAILY, Disp: 90 tablet, Rfl: 1    norethindrone (Meredith) 0.35 MG tablet, Take 1 tablet by mouth Daily., Disp: 84 tablet, Rfl: 4   Allergies   Allergen Reactions    Ampicillin Rash      Past Surgical History:   Procedure Laterality Date    COLONOSCOPY N/A  "12/16/2024    Procedure: COLONOSCOPY;  Surgeon: Charly Fuentes MD;  Location: [facility] OR;  Service: Gastroenterology;  Laterality: N/A;    CYST REMOVAL      from jawbone    ENDOSCOPY      EYE SURGERY Bilateral     lasik    TRUNK LESION/CYST EXCISION Right 03/17/2021    Procedure: EXCISION LESION SHOULDER;  Surgeon: Sidra Mackey MD;  [facility];  Service: General;  Laterality: Right;    WISDOM TOOTH EXTRACTION        Social History     Socioeconomic History    Marital status:    Tobacco Use    Smoking status: Never    Smokeless tobacco: Never   Vaping Use    Vaping status: Never Used   Substance and Sexual Activity    Alcohol use: Yes     Comment: 0-2 a month, usually a nita or sangria    Drug use: Never    Sexual activity: Yes     Partners: Male     Birth control/protection: Condom, Birth control pill     Comment: Only with my       Family History   Problem Relation Age of Onset    Kidney nephrosis Mother     Diabetes Father         Started when he was older    Hearing loss Father     Cancer Maternal Aunt         Skin cancer    Osteoporosis Paternal Aunt     Cancer Paternal Uncle         Skin cancer    Breast cancer Neg Hx     Ovarian cancer Neg Hx        Review of Systems   Constitutional: Negative.    Gastrointestinal: Negative.    Genitourinary: Negative.            Objective   /68   Ht 165.1 cm (65\")   Wt 136 kg (299 lb 9.6 oz)   BMI 49.86 kg/m²     Physical Exam  Exam conducted with a chaperone present.   Constitutional:       Appearance: Normal appearance. She is well-developed and well-groomed. She is morbidly obese.   Eyes:      General: Lids are normal.      Extraocular Movements: Extraocular movements intact.      Conjunctiva/sclera: Conjunctivae normal.   Chest:   Breasts:     Breasts are symmetrical.      Right: No inverted nipple, mass, nipple discharge, skin change or tenderness.      Left: No inverted nipple, mass, nipple discharge, skin change " or tenderness.   Abdominal:      General: There is no distension.      Palpations: Abdomen is soft.      Tenderness: There is no abdominal tenderness.   Genitourinary:     Exam position: Lithotomy position.      Labia:         Right: No rash, tenderness or lesion.         Left: No rash, tenderness or lesion.       Urethra: No prolapse, urethral pain, urethral swelling or urethral lesion.      Vagina: No vaginal discharge, tenderness or lesions.      Cervix: No cervical motion tenderness, discharge, friability or lesion.      Uterus: Not enlarged and not tender.       Adnexa:         Right: No mass or tenderness.          Left: No mass or tenderness.     Musculoskeletal:      Cervical back: Neck supple.   Lymphadenopathy:      Upper Body:      Right upper body: No axillary adenopathy.      Left upper body: No axillary adenopathy.   Skin:     General: Skin is warm and dry.      Findings: No lesion.   Neurological:      General: No focal deficit present.      Mental Status: She is alert and oriented to person, place, and time.   Psychiatric:         Attention and Perception: Attention normal.         Mood and Affect: Mood normal.         Speech: Speech normal.         Behavior: Behavior normal.         Thought Content: Thought content normal.            Result Review :           Mammo Screening Digital Tomosynthesis Bilateral With CAD (11/08/2024 13:48)         Assessment and Plan  Diagnoses and all orders for this visit:    1. Well woman exam with routine gynecological exam (Primary)    2. Screening for cervical cancer  -     LIQUID-BASED PAP SMEAR WITH HPV GENOTYPING REGARDLESS OF INTERPRETATION ([facility])    3. Encounter for surveillance of contraceptive pills  -     norethindrone (Meredith) 0.35 MG tablet; Take 1 tablet by mouth Daily.  Dispense: 84 tablet; Refill: 4      Patient Instructions   Self breast exam monthly  Annual screening mammogram   Regular exercise             This note was electronically  signed.    Augusta Jaramillo PA-C   March 12, 2025

## 2025-03-14 LAB — REF LAB TEST METHOD: NORMAL

## 2025-04-15 DIAGNOSIS — E03.8 OTHER SPECIFIED HYPOTHYROIDISM: ICD-10-CM

## 2025-04-15 RX ORDER — LEVOTHYROXINE SODIUM 25 MCG
25 TABLET ORAL EVERY MORNING
Qty: 90 TABLET | Refills: 0 | OUTPATIENT
Start: 2025-04-15

## 2025-04-21 ENCOUNTER — ANESTHESIA EVENT (OUTPATIENT)
Dept: PERIOP | Facility: HOSPITAL | Age: 50
End: 2025-04-21
Payer: COMMERCIAL

## 2025-04-21 ENCOUNTER — ANESTHESIA (OUTPATIENT)
Dept: PERIOP | Facility: HOSPITAL | Age: 50
End: 2025-04-21
Payer: COMMERCIAL

## 2025-04-21 ENCOUNTER — HOSPITAL ENCOUNTER (OUTPATIENT)
Facility: HOSPITAL | Age: 50
Setting detail: HOSPITAL OUTPATIENT SURGERY
Discharge: HOME | End: 2025-04-21
Attending: INTERNAL MEDICINE | Admitting: INTERNAL MEDICINE
Payer: COMMERCIAL

## 2025-04-21 VITALS
TEMPERATURE: 98.2 F | OXYGEN SATURATION: 95 % | RESPIRATION RATE: 20 BRPM | SYSTOLIC BLOOD PRESSURE: 125 MMHG | BODY MASS INDEX: 47.09 KG/M2 | HEART RATE: 83 BPM | DIASTOLIC BLOOD PRESSURE: 94 MMHG | HEIGHT: 66 IN | WEIGHT: 293 LBS

## 2025-04-21 DIAGNOSIS — Z12.11 ENCOUNTER FOR SCREENING FOR MALIGNANT NEOPLASM OF COLON: ICD-10-CM

## 2025-04-21 PROCEDURE — 25010000002 FENTANYL CITRATE (PF) 50 MCG/ML SOLUTION: Performed by: NURSE ANESTHETIST, CERTIFIED REGISTERED

## 2025-04-21 PROCEDURE — 25010000002 PROPOFOL 200 MG/20ML EMULSION: Performed by: NURSE ANESTHETIST, CERTIFIED REGISTERED

## 2025-04-21 PROCEDURE — 25810000003 LACTATED RINGERS PER 1000 ML: Performed by: NURSE ANESTHETIST, CERTIFIED REGISTERED

## 2025-04-21 PROCEDURE — 81025 URINE PREGNANCY TEST: CPT | Performed by: ANESTHESIOLOGY

## 2025-04-21 PROCEDURE — 45385 COLONOSCOPY W/LESION REMOVAL: CPT | Performed by: INTERNAL MEDICINE

## 2025-04-21 RX ORDER — SODIUM CHLORIDE 0.9 % (FLUSH) 0.9 %
10 SYRINGE (ML) INJECTION AS NEEDED
Status: DISCONTINUED | OUTPATIENT
Start: 2025-04-21 | End: 2025-04-21 | Stop reason: HOSPADM

## 2025-04-21 RX ORDER — SODIUM CHLORIDE 0.9 % (FLUSH) 0.9 %
10 SYRINGE (ML) INJECTION EVERY 12 HOURS SCHEDULED
Status: DISCONTINUED | OUTPATIENT
Start: 2025-04-21 | End: 2025-04-21 | Stop reason: HOSPADM

## 2025-04-21 RX ORDER — FENTANYL CITRATE 50 UG/ML
50 INJECTION, SOLUTION INTRAMUSCULAR; INTRAVENOUS
Status: DISCONTINUED | OUTPATIENT
Start: 2025-04-21 | End: 2025-04-21 | Stop reason: HOSPADM

## 2025-04-21 RX ORDER — SODIUM CHLORIDE, SODIUM LACTATE, POTASSIUM CHLORIDE, CALCIUM CHLORIDE 600; 310; 30; 20 MG/100ML; MG/100ML; MG/100ML; MG/100ML
125 INJECTION, SOLUTION INTRAVENOUS ONCE
Status: DISCONTINUED | OUTPATIENT
Start: 2025-04-21 | End: 2025-04-21 | Stop reason: HOSPADM

## 2025-04-21 RX ORDER — IPRATROPIUM BROMIDE AND ALBUTEROL SULFATE 2.5; .5 MG/3ML; MG/3ML
3 SOLUTION RESPIRATORY (INHALATION) ONCE AS NEEDED
Status: DISCONTINUED | OUTPATIENT
Start: 2025-04-21 | End: 2025-04-21 | Stop reason: HOSPADM

## 2025-04-21 RX ORDER — FENTANYL CITRATE 50 UG/ML
INJECTION, SOLUTION INTRAMUSCULAR; INTRAVENOUS AS NEEDED
Status: DISCONTINUED | OUTPATIENT
Start: 2025-04-21 | End: 2025-04-21 | Stop reason: SURG

## 2025-04-21 RX ORDER — MIDAZOLAM HYDROCHLORIDE 1 MG/ML
1 INJECTION, SOLUTION INTRAMUSCULAR; INTRAVENOUS
Status: DISCONTINUED | OUTPATIENT
Start: 2025-04-21 | End: 2025-04-21 | Stop reason: HOSPADM

## 2025-04-21 RX ORDER — SODIUM CHLORIDE 9 MG/ML
40 INJECTION, SOLUTION INTRAVENOUS AS NEEDED
Status: DISCONTINUED | OUTPATIENT
Start: 2025-04-21 | End: 2025-04-21 | Stop reason: HOSPADM

## 2025-04-21 RX ORDER — SODIUM CHLORIDE, SODIUM LACTATE, POTASSIUM CHLORIDE, CALCIUM CHLORIDE 600; 310; 30; 20 MG/100ML; MG/100ML; MG/100ML; MG/100ML
INJECTION, SOLUTION INTRAVENOUS CONTINUOUS PRN
Status: DISCONTINUED | OUTPATIENT
Start: 2025-04-21 | End: 2025-04-21 | Stop reason: SURG

## 2025-04-21 RX ORDER — ONDANSETRON 2 MG/ML
4 INJECTION INTRAMUSCULAR; INTRAVENOUS AS NEEDED
Status: DISCONTINUED | OUTPATIENT
Start: 2025-04-21 | End: 2025-04-21 | Stop reason: HOSPADM

## 2025-04-21 RX ORDER — PROPOFOL 10 MG/ML
INJECTION, EMULSION INTRAVENOUS AS NEEDED
Status: DISCONTINUED | OUTPATIENT
Start: 2025-04-21 | End: 2025-04-21 | Stop reason: SURG

## 2025-04-21 RX ADMIN — PROPOFOL 50 MG: 10 INJECTION, EMULSION INTRAVENOUS at 10:12

## 2025-04-21 RX ADMIN — PROPOFOL 100 MG: 10 INJECTION, EMULSION INTRAVENOUS at 09:57

## 2025-04-21 RX ADMIN — PROPOFOL 50 MG: 10 INJECTION, EMULSION INTRAVENOUS at 10:01

## 2025-04-21 RX ADMIN — PROPOFOL 50 MG: 10 INJECTION, EMULSION INTRAVENOUS at 09:59

## 2025-04-21 RX ADMIN — PROPOFOL 50 MG: 10 INJECTION, EMULSION INTRAVENOUS at 10:16

## 2025-04-21 RX ADMIN — FENTANYL CITRATE 100 MCG: 50 INJECTION INTRAMUSCULAR; INTRAVENOUS at 09:52

## 2025-04-21 RX ADMIN — PROPOFOL 50 MG: 10 INJECTION, EMULSION INTRAVENOUS at 10:04

## 2025-04-21 RX ADMIN — PROPOFOL 50 MG: 10 INJECTION, EMULSION INTRAVENOUS at 10:08

## 2025-04-21 RX ADMIN — SODIUM CHLORIDE, POTASSIUM CHLORIDE, SODIUM LACTATE AND CALCIUM CHLORIDE: 600; 310; 30; 20 INJECTION, SOLUTION INTRAVENOUS at 09:52

## 2025-04-21 NOTE — NURSING NOTE
Dr. Fuentes aware of pt stating results from prep were brown and was unable to complete full prep. Order for tap water enema, and enema was given.

## 2025-04-21 NOTE — H&P
GASTROENTEROLOGY OFFICE NOTE  Nilay Xiao  [id number]  1975      CHIEF COMPLAINT  Surveillance colonoscopy.  Large sessile adenomatous polyps removed from the cecum in December 2024    HISTORY OF PRESENT ILLNESS:  49-year-old white female presents for surveillance colonoscopy.  December 16, 2020 for large sessile cecal polyps were removed after submucosal saline and snare.  10 endoclips were required to close all defects, 5 large ascending colon polyps and 2 small transverse colon polyps as well as 1 large pedunculated ascending colon polyps were all removed.  Patient presents today for surveillance denying any dysphagia odynophagia early satiety nausea vomiting melena bright red blood per rectum constipation or diarrhea    PAST MEDICAL HISTORY  Past Medical History:    Allergic    Anxiety    Has increased in the past few years    Disease of thyroid gland    Fibroid    Not sure on this. Was told I had a tumor but they wouldn’t cut it out because it would just grow back    GERD (gastroesophageal reflux disease)    History of Papanicolaou smear of cervix    Hypertension    Irritable bowel syndrome    Migraine    Obesity    Ovarian cyst    PMS (premenstrual syndrome)    Varicella    Visual impairment    Lights hurt my eyes at night, blurred vision with bad headac        PAST SURGICAL HISTORY  Past Surgical History:    COLONOSCOPY    Procedure: COLONOSCOPY;  Surgeon: Charly Fuentes MD;  Location: [facility];  Service: Gastroenterology;  Laterality: N/A;    CYST REMOVAL    from jawbone    ENDOSCOPY    EYE SURGERY    lasik    TRUNK LESION/CYST EXCISION    Procedure: EXCISION LESION SHOULDER;  Surgeon: Sidra Mackey MD;  Location: [facility];  Service: General;  Laterality: Right;    WISDOM TOOTH EXTRACTION        MEDICATIONS:  acetaminophen, busPIRone, ibuprofen, levothyroxine, lisinopril, and norethindrone     ALLERGIES  is allergic to ampicillin.    FAMILY HISTORY:  Cancer-related family history  "includes Cancer in her maternal aunt and paternal uncle. There is no history of Breast cancer or Ovarian cancer.  Colon Cancer-related family history is not on file.    SOCIAL HISTORY  She  reports that she has never smoked. She has never used smokeless tobacco. She reports current alcohol use. She reports that she does not use drugs.   .  She does not have any children.  She is a non-smoker.  She rarely drinks alcoholic beverages.  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    REVIEW OF SYSTEMS  Cardiovascular, pulmonary and generalized review of systems are pertinent as reviewed above    PHYSICAL EXAM   /88 (BP Location: Left arm, Patient Position: Lying)   Pulse 90   Temp 97.9 °F (36.6 °C) (Temporal)   Resp 18   Ht 166.4 cm (65.5\")   Wt 136 kg (300 lb)   SpO2 96%   BMI 49.16 kg/m²   General: Alert and oriented x 3. In no apparent or acute distress.  and No stigmata of chronic liver disease  HEENT: Anicteric sclerae. Normal oropharynx  Neck: Supple. Without lymphadenopathy  CV: Regular rate and rhythm, S1, S2  Lungs: Clear to ausculation. Without rales, rhonchi and wheezing  Abdomen:  Soft,non-distended without palpable masses or hepatosplenomeagaly, areas of rebound tenderness or guarding.   Extremeties: without clubbing, cyanosis or edema  Neurologic:  Alert and oriented x 3 without focal motor or sensory deficits  Rectal exam: deferred     Results for orders placed or performed during the hospital encounter of 04/21/25   POC Urine Pregnancy    Collection Time: 04/21/25  9:00 AM    Specimen: Urine   Result Value Ref Range    HCG, Urine, QL Negative Negative    Lot Number [id number]     Internal Positive Control Passed Positive, Passed    Internal Negative Control Passed Negative, Passed    Expiration Date 08/12/2026         Results Review:  I reviewed the patient's new clinical results.      ASSESSMENT  1.-Patient status post piecemeal resection of large sessile polyp " presents for surveillance colonoscopy    PLAN  1.-Surveillance colonoscopy      Charly Fuentes MD  4/21/2025   09:47 EDT        Addendum  Cecal polyp appears to have been largely excised in its entirety area of prominent mucosa removed with cold snare.  Pathology pending.  Repeat colonoscopy is recommended in 3 years

## 2025-04-21 NOTE — ANESTHESIA POSTPROCEDURE EVALUATION
Patient: Nilay Xiao    Procedure Summary       Date: 04/21/25 Room / Location: [facility] OR  /  [facility] OR    Anesthesia Start: 0952 Anesthesia Stop: 1023    Procedure: COLONOSCOPY WITH BIOPSY Diagnosis:       Encounter for screening for malignant neoplasm of colon      (Encounter for screening for malignant neoplasm of colon [Z12.11])    Surgeons: Charly Fuentes MD Provider: Nehemiah Georges MD    Anesthesia Type: general ASA Status: 3            Anesthesia Type: general    Vitals  Vitals Value Taken Time   /94 04/21/25 10:53   Temp 98.2 °F (36.8 °C) 04/21/25 10:25   Pulse 83 04/21/25 10:53   Resp 20 04/21/25 10:53   SpO2 95 % 04/21/25 10:53           Post Anesthesia Care and Evaluation    Patient location during evaluation: PACU  Patient participation: complete - patient participated  Level of consciousness: awake  Pain score: 0  Pain management: adequate    Airway patency: patent  Anesthetic complications: No anesthetic complications  PONV Status: controlled  Cardiovascular status: acceptable and blood pressure returned to baseline  Respiratory status: acceptable and room air  Hydration status: acceptable    Comments: Patient comfortable with discharge at this time.

## 2025-04-21 NOTE — ANESTHESIA PREPROCEDURE EVALUATION
Anesthesia Evaluation     Patient summary reviewed and Nursing notes reviewed   no history of anesthetic complications:   NPO Solid Status: > 8 hours  NPO Liquid Status: > 8 hours           Airway   Mallampati: II  TM distance: >3 FB  Neck ROM: full  No difficulty expected  Dental    (+) poor dentition        Pulmonary - negative pulmonary ROS    breath sounds clear to auscultation  Cardiovascular   Exercise tolerance: excellent (>7 METS)    Rhythm: regular  Rate: normal    (+) hypertension      Neuro/Psych  (+) headaches, psychiatric history Depression  GI/Hepatic/Renal/Endo    (+) obesity, morbid obesity, GERD    Musculoskeletal (-) negative ROS    Abdominal   (+) obese    Abdomen: soft.   Substance History - negative use     OB/GYN negative ob/gyn ROS         Other - negative ROS                         Anesthesia Plan    ASA 3     general   total IV anesthesia  intravenous induction     Anesthetic plan, risks, benefits, and alternatives have been provided, discussed and informed consent has been obtained with: patient.  Pre-procedure education provided  Use of blood products discussed with  Consented to blood products.

## 2025-04-23 LAB — REF LAB TEST METHOD: NORMAL

## 2025-04-24 ENCOUNTER — RESULTS FOLLOW-UP (OUTPATIENT)
Dept: PERIOP | Facility: HOSPITAL | Age: 50
End: 2025-04-24
Payer: COMMERCIAL

## 2025-04-24 NOTE — LETTER
April 25, 2025    Nilay Xiao  [address] [place] [address]      Dear Ms. Xiao:    This letter is to review the biopsy report from your April 21, 2025 colonoscopy.    As you know you had a large polyp in the cecum on your December 2024 colonoscopy.    A small residual polyp was identified in the cecum this time and it was removed.    The polyp proved to be a sessile serrated adenoma.  These are benign polyps that have the potential for becoming malignant.    A repeat colonoscopy is recommended in 3 years to best diminish your future risk of developing colon cancer.    I hope this letter finds you well.  I encourage you to call with any questions or concerns you may have.    Sincerely,  Jake Fuentes MD    CC: Stephenie Laughlin MD

## 2025-04-24 NOTE — TELEPHONE ENCOUNTER
April 24, 2025    Nilay Xiao  [address] [place] [address]      Dear Ms. Xiao:    This letter is to review the biopsy report from your April 21, 2025 colonoscopy    As you know he had a large polyp in the cecum on your December 2024 colonoscopy.    A small residual polyp was identified in the cecum this time and it was removed.    The polyp proved to be a sessile serrated adenoma.  These are benign polyps that have the potential for becoming malignant.    A repeat colonoscopy is recommended in 3 years to best diminish your future risk of developing colon cancer.    I hope this letter finds you well.  I encourage you to call with any questions or concerns you may have.    Sincerely,  Jake Fuentes MD    CC: Stephenie Laughlin MD

## 2025-04-25 ENCOUNTER — OFFICE VISIT (OUTPATIENT)
Dept: FAMILY MEDICINE CLINIC | Facility: CLINIC | Age: 50
End: 2025-04-25
Payer: COMMERCIAL

## 2025-04-25 VITALS
SYSTOLIC BLOOD PRESSURE: 138 MMHG | BODY MASS INDEX: 47.09 KG/M2 | WEIGHT: 293 LBS | OXYGEN SATURATION: 100 % | DIASTOLIC BLOOD PRESSURE: 82 MMHG | HEIGHT: 66 IN | TEMPERATURE: 97.5 F | HEART RATE: 107 BPM

## 2025-04-25 DIAGNOSIS — Z13.6 ENCOUNTER FOR LIPID SCREENING FOR CARDIOVASCULAR DISEASE: ICD-10-CM

## 2025-04-25 DIAGNOSIS — I10 ESSENTIAL HYPERTENSION: ICD-10-CM

## 2025-04-25 DIAGNOSIS — Z13.220 ENCOUNTER FOR LIPID SCREENING FOR CARDIOVASCULAR DISEASE: ICD-10-CM

## 2025-04-25 DIAGNOSIS — E03.9 HYPOTHYROIDISM, UNSPECIFIED TYPE: Primary | ICD-10-CM

## 2025-04-25 PROCEDURE — 99214 OFFICE O/P EST MOD 30 MIN: CPT | Performed by: FAMILY MEDICINE

## 2025-04-25 RX ORDER — LEVOTHYROXINE SODIUM 25 UG/1
25 TABLET ORAL
Qty: 90 TABLET | Refills: 0 | Status: SHIPPED | OUTPATIENT
Start: 2025-04-25

## 2025-04-25 RX ORDER — LISINOPRIL 20 MG/1
20 TABLET ORAL DAILY
Qty: 90 TABLET | Refills: 1 | Status: SHIPPED | OUTPATIENT
Start: 2025-04-25

## 2025-04-29 LAB
ALBUMIN SERPL-MCNC: 4.1 G/DL (ref 3.9–4.9)
ALP SERPL-CCNC: 95 IU/L (ref 44–121)
ALT SERPL-CCNC: 14 IU/L (ref 0–32)
AMBIG ABBREV CMP14 DEFAULT: NORMAL
AMBIG ABBREV LP DEFAULT: NORMAL
AST SERPL-CCNC: 13 IU/L (ref 0–40)
BILIRUB SERPL-MCNC: 0.2 MG/DL (ref 0–1.2)
BUN SERPL-MCNC: 11 MG/DL (ref 6–24)
BUN/CREAT SERPL: 14 (ref 9–23)
CALCIUM SERPL-MCNC: 9.6 MG/DL (ref 8.7–10.2)
CHLORIDE SERPL-SCNC: 103 MMOL/L (ref 96–106)
CHOLEST SERPL-MCNC: 160 MG/DL (ref 100–199)
CO2 SERPL-SCNC: 23 MMOL/L (ref 20–29)
CREAT SERPL-MCNC: 0.77 MG/DL (ref 0.57–1)
EGFRCR SERPLBLD CKD-EPI 2021: 96 ML/MIN/1.73
GLOBULIN SER CALC-MCNC: 2.9 G/DL (ref 1.5–4.5)
GLUCOSE SERPL-MCNC: 101 MG/DL (ref 70–99)
HDLC SERPL-MCNC: 52 MG/DL
IMP & REVIEW OF LAB RESULTS: NORMAL
LDLC SERPL CALC-MCNC: 93 MG/DL (ref 0–99)
POTASSIUM SERPL-SCNC: 4.3 MMOL/L (ref 3.5–5.2)
PROT SERPL-MCNC: 7 G/DL (ref 6–8.5)
SODIUM SERPL-SCNC: 139 MMOL/L (ref 134–144)
T4 FREE SERPL-MCNC: 0.67 NG/DL (ref 0.82–1.77)
TRIGL SERPL-MCNC: 80 MG/DL (ref 0–149)
TSH SERPL DL<=0.005 MIU/L-ACNC: 5.23 UIU/ML (ref 0.45–4.5)
VLDLC SERPL CALC-MCNC: 15 MG/DL (ref 5–40)

## 2025-05-12 NOTE — PROGRESS NOTES
"Nilay Xiao     VITALS: Blood pressure 138/82, pulse 107, temperature 97.5 °F (36.4 °C), temperature source Temporal, height 166.4 cm (65.5\"), weight 135 kg (298 lb 3.2 oz), last menstrual period 04/17/2025, SpO2 100%, not currently breastfeeding.    Subjective  Chief Complaint  Hypothyroidism    Subjective          History of Present Illness:    History of Present Illness  The patient is a 49-year-old female with medical conditions significant for anxiety and depression who presents to the clinic for a medical follow-up.    She reports an improvement in her health status since her last visit in January 2025. However, a recent episode of sinus infection occurred a couple of weekends ago, characterized by partial voice loss and severe ear pain during the night. Despite initial relief from sinus medication, the symptoms recurred, prompting her to seek urgent care on Saturday. She was prescribed neomycin ear drops, which she administered three times daily for seven days, resulting in symptom resolution. Throat discomfort persists, particularly when chewing or swallowing, and she notes a change in her singing voice. Nasal saline spray and Flonase have been used for symptom management.    A colonoscopy was performed in December 2024, during which 11 polyps were removed. A follow-up colonoscopy in April 2025 revealed the presence of one polyp. Concerns are expressed about the potential for rapid polyp growth. Increased constipation is reported since starting thyroid medication.    A trip to [place] is planned for September 2025, and vaccinations for yellow fever and typhoid have been received. Booster shots for polio and tetanus are advised, and malaria prophylaxis is being considered. Bottled water will be used for oral hygiene during the trip.    Brown spots have developed on the legs, which have not changed in appearance. An itchy spot on the back, located along the bra line, is also reported.    Weight loss injections " are being considered, with concerns about potential weight gain after discontinuation.    Back tenderness is experienced, and exercises to strengthen the back are being explored.    Currently fasting for lab work, a refill of lisinopril prescription is sought.    PAST SURGICAL HISTORY:  - Colonoscopy in December 2024 with removal of 11 polyps  - Follow-up colonoscopy in April 2025 with removal of one polyp    No complaints regarding medications.     The following portions of the patient's history were reviewed and updated as appropriate: allergies, current medications, past family history, past medical history, past social history, past surgical history and problem list.    Past Medical History  Past Medical History:   Diagnosis Date    Allergic     Anxiety 2023    Has increased in the past few years    Disease of thyroid gland     Fibroid 2005?    Not sure on this. Was told I had a tumor but they wouldn’t cut it out because it would just grow back    GERD (gastroesophageal reflux disease)     History of Papanicolaou smear of cervix 06/2016    Hypertension     Irritable bowel syndrome     Migraine     Obesity     Ovarian cyst In my 20s    PMS (premenstrual syndrome)     Varicella Early 80s    Visual impairment     Lights hurt my eyes at night, blurred vision with bad headac       Surgical History  Past Surgical History:   Procedure Laterality Date    COLONOSCOPY N/A 12/16/2024    Procedure: COLONOSCOPY;  Surgeon: Charly Fuentes MD;  Location: [facility] OR;  Service: Gastroenterology;  Laterality: N/A;    COLONOSCOPY N/A 4/21/2025    Procedure: COLONOSCOPY WITH BIOPSY;  Surgeon: Charly Fuentes MD;  Location: [facility] OR;  Service: Gastroenterology;  Laterality: N/A;    [facility]    END[facility]COPY      EYE SURGERY Bilateral     lasik    TRUNK LESION/CYST EXCISION Right 03/17/2021    Procedure: EXCISION LESION SHOULDER;  Surgeon: Sidra Mackey MD;  Location: [facility] OR;   "Service: General;  Laterality: Right;    WISDOM TOOTH EXTRACTION         Family History  Family History   Problem Relation Age of Onset    Kidney nephrosis Mother     Diabetes Father         Started when he was older    Hearing loss Father     Cancer Maternal Aunt         Skin cancer    Osteoporosis Paternal Aunt     Cancer Paternal Uncle         Skin cancer    Breast cancer Neg Hx     Ovarian cancer Neg Hx        Social History  Social History     Socioeconomic History    Marital status:    Tobacco Use    Smoking status: Never    Smokeless tobacco: Never   Vaping Use    Vaping status: Never Used   Substance and Sexual Activity    Alcohol use: Yes     Comment: 0-2 a month, usually a nita or sangria    Drug use: Never    Sexual activity: Yes     Partners: Male     Birth control/protection: Condom, Birth control pill     Comment: Only with my        Objective   Vital Signs:   /82 (BP Location: Right arm, Patient Position: Sitting, Cuff Size: Large Adult)   Pulse 107   Temp 97.5 °F (36.4 °C) (Temporal)   Ht 166.4 cm (65.5\")   Wt 135 kg (298 lb 3.2 oz)   SpO2 100%   BMI 48.87 kg/m²       Physical Exam     Physical Exam  Ears: Fluid present in both ears, left ear appears contracted with fluid being pushed and dripping down the throat.  Skin: Multiple keratosis spots noted on legs, chest, and back. No changes observed in the spots.    Gen: Patient in NAD. Pleasant and answers appropriately. A&Ox3.    HEENT: NC/AT. No lesions noted. Conjunctiva clear, sclera nonicteric. PERRL. EOMI without nystagmus or strabismus. Fundi appear benign. No hemorrhages or exudates of eyes.  Nasal mucosa erythematous, and nonedematous. Frontal and maxillary sinuses are nontender. O/P erythematous and moist without exudate.    Neck: Supple without lymph nodes palpated. FROM. No carotid bruits appreciated bilaterally.    Lungs: Decreased B/L without rales, rhonchi, crackles, or wheezes.    Heart: RRR. S1 and S2 " normal. No S3 or S4. No MRGT.    Abd: Soft, nontender,nondistended. (+)BSx4 quadrants.     Extrem: No CCE. Radial pulses 2+/4 and equal B/L. FROMx4. No bone, joint, or muscle tenderness noted.    Neuro: No focal motor/sensory deficits.    Procedures    Result Review :   The following data was reviewed by: Stephenie Ewing MD on 04/25/2025:       Results  Diagnostic Testing   - Colonoscopy: 04/2025, One sessile polyp found and removed. Pathology indicates it is the same kind as previous polyps, not precancerous.           Assessment and Plan      Nilay Xiao is a 49 y.o. here for medical followup.    Diagnoses and all orders for this visit:    1. Hypothyroidism, unspecified type (Primary)  -     levothyroxine (Synthroid) 25 MCG tablet; Take 1 tablet by mouth Every Morning.  Dispense: 90 tablet; Refill: 0  -     TSH; Future  -     T4, Free; Future  -     Comprehensive Metabolic Panel; Future  -     TSH; Future  -     T4, Free; Future  -     Comprehensive Metabolic Panel; Future    2. Essential hypertension  -     lisinopril (PRINIVIL,ZESTRIL) 20 MG tablet; Take 1 tablet by mouth Daily.  Dispense: 90 tablet; Refill: 1  -     Comprehensive Metabolic Panel; Future  -     Comprehensive Metabolic Panel; Future    3. Encounter for lipid screening for cardiovascular disease  -     Lipid Panel; Future  -     Lipid Panel; Future        Assessment & Plan  1. Anxiety and depression.  - Continues to follow up with her therapist regularly.  - No new symptoms reported during this visit.  - Current medication regimen remains effective.  - Advised to maintain regular therapy sessions.    2. Allergic rhinitis.  - Symptoms likely due to allergies.  - Nasal saline spray and salt water gargles recommended for throat irritation.  - No significant changes in symptoms since last visit.  - Advised to continue current allergy management.    3. Ear pain.  - Recent sinus infection led to ear pain and voice loss.  - Treated with neomycin ear  drops, resulting in symptom resolution.  - Examination reveals normal ear appearance with no signs of infection.  - No further treatment required at this time.    4. Colon polyps.  - Colonoscopy in 12/2024 revealed multiple polyps; repeat in 04/2025 showed one polyp.  - Advised to increase fiber intake to prevent constipation and reduce polyp risk.  - Scheduled for repeat colonoscopy in 3 years unless symptoms like bleeding or significant constipation occur.  - Discussed the importance of monitoring for changes in bowel habits.    5. Travel immunizations.  - Planning a trip to [facility] in 09/2025.  - Recommended polio booster, tetanus booster, and hepatitis A vaccine.  - Can obtain vaccines at local pharmacy or health department.  - Advised to use bottled water for drinking and brushing teeth during travel.    6. Keratosis.  - Brown spots identified as benign keratosis.  - Advised to monitor for changes in appearance.  - Cryotherapy can be considered post-trip if spots become bothersome.  - No immediate intervention required.    7. Weight management.  - Lost 2 pounds since last visit.  - Weight loss injections to be discussed upon return from [facility].  - Encouraged to maintain healthy eating habits and exercise.  - Advised to focus on lifestyle changes for sustainable weight loss.    8. Back pain.  - Advised to engage in exercises to strengthen back muscles.  - No new symptoms reported.  - Emphasized importance of regular physical activity.    9. Thyroid disorder.  - Thyroid levels stable over past visits.  - Fasting labs to be conducted today.  - Will be contacted within 3 months if thyroid levels become unstable.  - Discussed cost-saving options for lab tests at [facility].    10. Medication management.  - Lisinopril prescription refilled.  - No issues reported with current medication regimen.  - Advised to monitor blood pressure regularly.  - Prescription drug monitoring program not discussed.      Class 3 Severe  Obesity (BMI >=40). Obesity-related health conditions include the following: none. Obesity is unchanged. BMI is is above average; BMI management plan is completed. We discussed portion control and increasing exercise.       Patient or patient representative verbalized consent for the use of Ambient Listening during the visit with  Stephenie Ewing MD for chart documentation. 5/11/2025  23:30 EDT        Follow Up   Return in about 6 months (around 10/25/2025).  Findings and plans discussed with patient who verbalizes understanding and agreement. Will followup with patient once results are in. Patient was given instructions and counseling regarding her condition or for health maintenance advice. Please see specific information pulled into the AVS if appropriate.       Stephenie Ewing MD

## 2025-07-07 DIAGNOSIS — E03.9 HYPOTHYROIDISM, UNSPECIFIED TYPE: ICD-10-CM

## 2025-07-07 RX ORDER — LEVOTHYROXINE SODIUM 25 MCG
25 TABLET ORAL EVERY MORNING
Qty: 90 TABLET | Refills: 0 | OUTPATIENT
Start: 2025-07-07

## 2025-07-11 ENCOUNTER — OFFICE VISIT (OUTPATIENT)
Dept: FAMILY MEDICINE CLINIC | Facility: CLINIC | Age: 50
End: 2025-07-11
Payer: COMMERCIAL

## 2025-07-11 VITALS
WEIGHT: 293 LBS | RESPIRATION RATE: 16 BRPM | DIASTOLIC BLOOD PRESSURE: 72 MMHG | HEART RATE: 86 BPM | SYSTOLIC BLOOD PRESSURE: 114 MMHG | OXYGEN SATURATION: 96 % | HEIGHT: 66 IN | TEMPERATURE: 96.8 F | BODY MASS INDEX: 47.09 KG/M2

## 2025-07-11 DIAGNOSIS — L85.3 DRY SKIN: ICD-10-CM

## 2025-07-11 DIAGNOSIS — M25.561 CHRONIC PAIN OF RIGHT KNEE: Primary | ICD-10-CM

## 2025-07-11 DIAGNOSIS — G89.29 CHRONIC PAIN OF RIGHT KNEE: Primary | ICD-10-CM

## 2025-07-11 DIAGNOSIS — E03.9 HYPOTHYROIDISM, UNSPECIFIED TYPE: ICD-10-CM

## 2025-07-11 PROCEDURE — 99214 OFFICE O/P EST MOD 30 MIN: CPT | Performed by: FAMILY MEDICINE

## 2025-07-11 RX ORDER — LEVOTHYROXINE SODIUM 25 UG/1
25 TABLET ORAL
Qty: 90 TABLET | Refills: 0 | Status: SHIPPED | OUTPATIENT
Start: 2025-07-11

## 2025-07-18 NOTE — PROGRESS NOTES
"Nilay Xiao     VITALS: Blood pressure 114/72, pulse 86, temperature 96.8 °F (36 °C), temperature source Temporal, resp. rate 16, height 166.4 cm (65.5\"), weight (!) 138 kg (303 lb 9.6 oz), last menstrual period 07/09/2025, SpO2 96%, not currently breastfeeding.    Subjective  Chief Complaint  Knee Pain    Subjective          History of Present Illness:    History of Present Illness  The patient is a 50-year-old female with medical conditions significant for anxiety and depression who presents to the clinic for a medical follow-up. She fell, and her knee is hurting.    She experienced a fall approximately 3 to 4 years ago, which resulted in knee pain and necessitated the use of a cane for a period. About 2 to 3 months ago, she had another fall, landing on the same knee. The incident was so sudden that she does not recall the specifics but believes she may have tripped. Following this fall, she experienced soreness in her knee upon touch. Her  suggested that it might take some time for the bruise to heal. Currently, she reports an improvement in her condition. However, she experiences pain in her leg when walking, which sometimes causes her to limp. She also reports occasional instability in her knee, describing it as feeling like it might buckle or lock up. She is planning a trip to [facility] and is concerned about her ability to walk extensively. She has been making efforts to walk more but finds it challenging due to the pain in her leg. She is curious if her weight could be contributing to her knee issues. She also mentions that her knee pain tends to worsen during rainy weather, extending up to her hip and down to her ankle.    She reports feeling fatigued, attributing it to her cat disturbing her sleep.    She also mentions a sensation of dryness in her skin.    No complaints regarding medications.     The following portions of the patient's history were reviewed and updated as appropriate: " allergies, current medications, past family history, past medical history, past social history, past surgical history and problem list.    Past Medical History  Past Medical History:   Diagnosis Date    Allergic     Anxiety 2023    Has increased in the past few years    Disease of thyroid gland     Fibroid 2005?    Not sure on this. Was told I had a tumor but they wouldn’t cut it out because it would just grow back    GERD (gastroesophageal reflux disease)     History of Papanicolaou smear of cervix 06/2016    Hypertension     Irritable bowel syndrome     Migraine     Obesity     Ovarian cyst In my 20s    PMS (premenstrual syndrome)     Varicella Early 80s    Visual impairment     Lights hurt my eyes at night, blurred vision with bad headac       Surgical History  Past Surgical History:   Procedure Laterality Date    COLONOSCOPY N/A 12/16/2024    Procedure: COLONOSCOPY;  Surgeon: Charly Fuentes MD;  [facility];  Service: Gastroenterology;  Laterality: N/A;    COLONOSCOPY N/A 4/21/2025    Procedure: COLONOSCOPY WITH BIOPSY;  Surgeon: Charly Fuentes MD;  [facility];  Service: Gastroenterology;  Laterality: N/A;    CYST REMOVAL      from jawbone    ENDOSCOPY      EYE SURGERY Bilateral     lasik    TRUNK LESION/CYST EXCISION Right 03/17/2021    Procedure: EXCISION LESION SHOULDER;  Surgeon: Sidra Mackey MD;  [facility];  Service: General;  Laterality: Right;    WISDOM TOOTH EXTRACTION         Family History  Family History   Problem Relation Age of Onset    Kidney nephrosis Mother     Diabetes Father         Started when he was older    Hearing loss Father     Cancer Maternal Aunt         Skin cancer    Osteoporosis Paternal Aunt     Cancer Paternal Uncle         Skin cancer    Breast cancer Neg Hx     Ovarian cancer Neg Hx        Social History  Social History     Socioeconomic History    Marital status:    Tobacco Use    Smoking status: Never     "Smokeless tobacco: Never   Vaping Use    Vaping status: Never Used   Substance and Sexual Activity    Alcohol use: Yes     Comment: 0-2 a month, usually a nita or sangria    Drug use: Never    Sexual activity: Yes     Partners: Male     Birth control/protection: Condom, Birth control pill     Comment: Only with my        Objective   Vital Signs:   /72 (BP Location: Right arm, Patient Position: Sitting, Cuff Size: Large Adult)   Pulse 86   Temp 96.8 °F (36 °C) (Temporal)   Resp 16   Ht 166.4 cm (65.5\")   Wt (!) 138 kg (303 lb 9.6 oz)   SpO2 96%   BMI 49.75 kg/m²       Physical Exam     Physical Exam      Gen: Patient in NAD. Pleasant and answers appropriately. A&Ox3.    Skin: Warm and dry with normal turgor. No purpura, rashes, or unusual pigmentation noted. Hair is normal in appearance and distribution.    HEENT: NC/AT. No lesions noted. Conjunctiva clear, sclera nonicteric. PERRL. EOMI without nystagmus or strabismus. Fundi appear benign. No hemorrhages or exudates of eyes. Auditory canals are patent bilaterally without lesions. TMs intact,  nonerythematous, bulging without lesions. Nasal mucosa pink, nonerythematous, and nonedematous. Frontal and maxillary sinuses are nontender. O/P nonerythematous and moist without exudate.    Neck: Supple without lymph nodes palpated. FROM. No carotid bruits appreciated bilaterally.    Lungs: CTA B/L without rales, rhonchi, crackles, or wheezes.    Heart: RRR. S1 and S2 normal. No S3 or S4. No MRGT.    Abd: Soft, nontender,nondistended. (+)BSx4 quadrants.     Extrem: No CCE. Radial pulses 2+/4 and equal B/L.  Right knee : Decreased range of motion with some tenderness to palpation.  Benign valgus and varus.  Benign anterior drawer and Yoana's.    Neuro: No focal motor/sensory deficits.    Procedures    Result Review :   The following data was reviewed by: Stephenie Ewing MD on 07/11/2025:       Results  Labs  TSH          1/17/2025    15:30 " 4/28/2025    09:05   TSH   TSH 4.180  5.230                 Assessment and Plan      Nilay Xiao is a 50 y.o. here for medical followup.    Diagnoses and all orders for this visit:    1. Chronic pain of right knee (Primary)  -     XR Knee 3 View Right    2. Hypothyroidism, unspecified type  -     levothyroxine (Synthroid) 25 MCG tablet; Take 1 tablet by mouth Every Morning.  Dispense: 90 tablet; Refill: 0    3. Dry skin        Assessment & Plan  1. Right knee pain.  - The physical examination revealed no tears in the ACL, PCL, or meniscus.  - Pain may be due to arthritis, a previous fracture, or a long-standing injury requiring knee strengthening.  - An x-ray of the right knee will be ordered to rule out any fractures.  - If the x-ray shows no abnormalities, a referral for physical therapy will be made to strengthen the knee. If a fracture is detected, a referral to orthopedics will be necessary.    2.  Hypothyroidism.  - Last thyroid test was slightly abnormal.  - If she reports feeling tired, an increase in Synthroid dosage will be considered.  - Discussed the potential need for adjusting Synthroid based on symptoms.    3. Skin dryness.  - Advised to apply Aquaphor, Eucerin, or a heavy lotion to alleviate dryness.  - Discussed the use of thick lotions for better hydration.  - Recommended consistent use of moisturizing products to manage symptoms.            Patient or patient representative verbalized consent for the use of Ambient Listening during the visit with  Stephenie Ewing MD for chart documentation. 7/18/2025  03:44 EDT      I spent 32 minutes caring for Nilay on this date of service. This time includes time spent by me in the following activities:obtaining and/or reviewing a separately obtained history, performing a medically appropriate examination and/or evaluation , and counseling and educating the patient/family/caregiver  Follow Up   Return (already has appt) XRAY.  Findings and plans discussed  with patient who verbalizes understanding and agreement. Will followup with patient once results are in. Patient was given instructions and counseling regarding her condition or for health maintenance advice. Please see specific information pulled into the AVS if appropriate.       Stephenie Ewing MD

## 2025-07-23 ENCOUNTER — TELEPHONE (OUTPATIENT)
Dept: FAMILY MEDICINE CLINIC | Facility: CLINIC | Age: 50
End: 2025-07-23
Payer: COMMERCIAL

## 2025-07-23 NOTE — TELEPHONE ENCOUNTER
Caller: Nilay Xiao    Relationship: Self    Best call back number: [phone]     What is the medical concern/diagnosis: RIGHT KNEE PAIN    What specialty or service is being requested: PHYSICAL THERAPY    What is the provider, practice or medical service name: [address] PHYSICAL THERAPY    What is the office location: [facility]    What is the office phone number: [phone] (FROM WEBSITE)    Any additional details: PATIENT IS STILL EXPERIENCING PAIN AND WOULD LIKE TO KNOW THE STATUS OF THE REFERRAL TO PHYSICAL THERAPY. PLEASE CALL PATIENT AND ADVISE.

## 2025-07-24 DIAGNOSIS — M25.561 CHRONIC PAIN OF RIGHT KNEE: Primary | ICD-10-CM

## 2025-07-24 DIAGNOSIS — G89.29 CHRONIC PAIN OF RIGHT KNEE: Primary | ICD-10-CM

## 2025-07-24 NOTE — TELEPHONE ENCOUNTER
She was indecisive about physical therapy at the time of the appointment so she said she would call if she wanted PT. Referral has been sent.

## 2025-08-18 ENCOUNTER — TELEPHONE (OUTPATIENT)
Dept: FAMILY MEDICINE CLINIC | Facility: CLINIC | Age: 50
End: 2025-08-18
Payer: COMMERCIAL

## 2025-08-19 DIAGNOSIS — G89.29 CHRONIC PAIN OF RIGHT KNEE: Primary | ICD-10-CM

## 2025-08-19 DIAGNOSIS — M25.561 CHRONIC PAIN OF RIGHT KNEE: Primary | ICD-10-CM

## 2025-08-27 DIAGNOSIS — Z30.41 ENCOUNTER FOR SURVEILLANCE OF CONTRACEPTIVE PILLS: ICD-10-CM

## 2025-08-27 RX ORDER — NORETHINDRONE 0.35 MG/1
1 TABLET ORAL DAILY
Qty: 84 TABLET | Refills: 0 | OUTPATIENT
Start: 2025-08-27

## 2025-08-27 RX ORDER — ACETAMINOPHEN AND CODEINE PHOSPHATE 120; 12 MG/5ML; MG/5ML
1 SOLUTION ORAL DAILY
Qty: 84 TABLET | Refills: 4 | Status: SHIPPED | OUTPATIENT
Start: 2025-08-27

## (undated) DEVICE — THE EXACTO COLD SNARE IS INTENDED TO BE USED WITHOUT DIATHERMIC ENERGY FOR THE ENDOSCOPIC RESECTION OF POLYP TISSUE IN THE GASTROINTESTINAL TRACT.: Brand: EXACTO

## (undated) DEVICE — FIRST STEP BEDSIDE ADD WATER KIT - RESEALABLE STAND-UP POUCH, ENDOSCOPIC CLEANING PAD - 1 POUCH: Brand: FIRST STEP BEDSIDE ADD WATER KIT - RESEALABLE STAND-UP POUCH, ENDOSCOPIC CLEANIN

## (undated) DEVICE — SUT VIC 3/0 TIES J104T

## (undated) DEVICE — ERBE NESSY®PLATE 170 SPLIT; 168CM²; CABLE 3M: Brand: ERBE

## (undated) DEVICE — SUT VIC 4/0 RB1 27IN J214H

## (undated) DEVICE — CONN Y IRR DISP 1P/U

## (undated) DEVICE — 3M™ STERI-STRIP™ REINFORCED ADHESIVE SKIN CLOSURES, R1547, 1/2 IN X 4 IN (12 MM X 100 MM), 6 STRIPS/ENVELOPE: Brand: 3M™ STERI-STRIP™

## (undated) DEVICE — PATIENT RETURN ELECTRODE, SINGLE-USE, CONTACT QUALITY MONITORING, ADULT, WITH 9FT CORD, FOR PATIENTS WEIGING OVER 33LBS. (15KG): Brand: MEGADYNE

## (undated) DEVICE — Device: Brand: SPOT EX ENDOSCOPIC TATTOO

## (undated) DEVICE — ST LINER SAFECAP GRN RED CP STRL

## (undated) DEVICE — PK BASIC 70

## (undated) DEVICE — KT ORCA ORCAPOD DISP STRL

## (undated) DEVICE — APPL CHLORAPREP HI/LITE 26ML ORNG

## (undated) DEVICE — HOLDER: Brand: DEROYAL

## (undated) DEVICE — ENDOGATOR AUXILIARY WATER JET CONNECTOR: Brand: ENDOGATOR

## (undated) DEVICE — SNAR POLYP CAPTIFLX MICRO OVL 13MM 240CM

## (undated) DEVICE — DBD-DRAPE,LAP,CHOLE,W/TROUGHS,STERILE: Brand: MEDLINE

## (undated) DEVICE — AIR/WATER CLEANING VALVES: Brand: AIR/WATER CLEANING VALVES

## (undated) DEVICE — Device: Brand: DEFENDO AIR/WATER/SUCTION AND BIOPSY VALVE

## (undated) DEVICE — GLV SURG PREMIERPRO MIC LTX PF SZ7 BRN

## (undated) DEVICE — AMD ANTIMICROBIAL NON-ADHERENT ISLAND DRESSING,0.2% POLYHEXAMETHYLENE BIGUANIDE HCI (PHMB): Brand: TELFA

## (undated) DEVICE — Device

## (undated) DEVICE — TRAP,MUCUS SPECIMEN,40CC: Brand: MEDLINE

## (undated) DEVICE — NDL SCLEROTHRPY INTERJECT 25G 4 240 CLR